# Patient Record
Sex: FEMALE | Race: WHITE | Employment: FULL TIME | URBAN - METROPOLITAN AREA
[De-identification: names, ages, dates, MRNs, and addresses within clinical notes are randomized per-mention and may not be internally consistent; named-entity substitution may affect disease eponyms.]

---

## 2017-03-15 ENCOUNTER — OFFICE VISIT (OUTPATIENT)
Dept: FAMILY MEDICINE CLINIC | Age: 52
End: 2017-03-15

## 2017-03-15 VITALS
SYSTOLIC BLOOD PRESSURE: 139 MMHG | BODY MASS INDEX: 30.21 KG/M2 | TEMPERATURE: 98 F | HEIGHT: 69 IN | RESPIRATION RATE: 16 BRPM | OXYGEN SATURATION: 94 % | HEART RATE: 88 BPM | DIASTOLIC BLOOD PRESSURE: 98 MMHG | WEIGHT: 204 LBS

## 2017-03-15 DIAGNOSIS — I10 ESSENTIAL HYPERTENSION: ICD-10-CM

## 2017-03-15 DIAGNOSIS — E78.5 HYPERLIPIDEMIA, UNSPECIFIED HYPERLIPIDEMIA TYPE: ICD-10-CM

## 2017-03-15 DIAGNOSIS — R73.03 PRE-DIABETES: Primary | ICD-10-CM

## 2017-03-15 LAB — HBA1C MFR BLD HPLC: 5.8 %

## 2017-03-15 RX ORDER — LOSARTAN POTASSIUM 25 MG/1
25 TABLET ORAL DAILY
Qty: 30 TAB | Refills: 0 | Status: SHIPPED | OUTPATIENT
Start: 2017-03-15 | End: 2017-04-14 | Stop reason: SDUPTHER

## 2017-03-15 RX ORDER — METFORMIN HYDROCHLORIDE 500 MG/1
500 TABLET ORAL 2 TIMES DAILY WITH MEALS
Qty: 180 TAB | Refills: 3 | Status: SHIPPED | OUTPATIENT
Start: 2017-03-15 | End: 2018-01-15 | Stop reason: SDUPTHER

## 2017-03-15 RX ORDER — LOSARTAN POTASSIUM 25 MG/1
25 TABLET ORAL DAILY
Qty: 30 TAB | Refills: 0 | Status: CANCELLED | OUTPATIENT
Start: 2017-03-15

## 2017-03-15 RX ORDER — METFORMIN HYDROCHLORIDE 500 MG/1
500 TABLET ORAL 2 TIMES DAILY WITH MEALS
Qty: 60 TAB | Refills: 2 | Status: SHIPPED | OUTPATIENT
Start: 2017-03-15 | End: 2017-03-15 | Stop reason: SDUPTHER

## 2017-03-15 NOTE — PROGRESS NOTES
Subjective:     Bruno Rocha is a 54 y.o. female who presents today with the following:  Chief Complaint   Patient presents with    Diabetes     HTN   Bruno Rocha is a 54 y.o. female with hypertension. Hypertension ROS: taking medications as instructed, no medication side effects noted, no TIA's, no chest pain on exertion, no dyspnea on exertion, no swelling of ankles. New concerns: has continued to have dry cough with Lisinopril, is now ready to try something else. Of note, patient has been out of BP meds for the last 4 days. Smoking status: continues to smoke 3/4 PPD. Has plan to wean off by this end of the summer. Pre-diabetes  Patient diagnosed with pre-diabetes; started on Metformin. Last A1c was down to 5.7 from 6.3. Has been able to sustain major dietary changes in the last 3 months. Feeling well overall. ROS:  Gen: denies fever, chills, fatigue, weight loss, weight gain  HEENT:denies blurry vision, nasal congestion, sore throat  Resp: denies dypsnea, cough, wheezing  CV: denies chest pain, palpitations, lower extremity edema  Abd: denies nausea, vomiting, diarrhea, constipation  Neuro: denies numbness/tingling  Endo: denies polyuria, polydipsia, heat/cold intolerance      Allergies   Allergen Reactions    Codeine Hives    Keflex [Cephalexin] Unknown (comments)    Morphine Hives    Pcn [Penicillins] Shortness of Breath    Sulfa (Sulfonamide Antibiotics) Hives         Current Outpatient Prescriptions:     losartan (COZAAR) 25 mg tablet, Take 1 Tab by mouth daily. , Disp: 30 Tab, Rfl: 0    metFORMIN (GLUCOPHAGE) 500 mg tablet, Take 1 Tab by mouth two (2) times daily (with meals). , Disp: 180 Tab, Rfl: 3    fluticasone (FLONASE) 50 mcg/actuation nasal spray, as needed for Rhinitis., Disp: , Rfl:     Past Medical History:   Diagnosis Date    Diabetes ([facility] [address]     gestational    Essential hypertension     Pre-diabetes        Past Surgical History:   Procedure Laterality Date    HX  SECTION      HX CHOLECYSTECTOMY      HX GYN  1988    ovarian cyst    HX HEENT      tonsillectomy and adenoidectomy    HX ORTHOPAEDIC  2001    foot sx    HX TUBAL LIGATION  1993       History   Smoking Status    Current Every Day Smoker    Packs/day: 0.75   Smokeless Tobacco    Never Used       Social History     Social History    Marital status: SINGLE     Spouse name: N/A    Number of children: N/A    Years of education: N/A     Occupational History          Social History Main Topics    Smoking status: Current Every Day Smoker     Packs/day: 0.75    Smokeless tobacco: Never Used    Alcohol use No    Drug use: None    Sexual activity: Not Asked     Other Topics Concern    None     Social History Narrative       Family History   Problem Relation Age of Onset    Heart Disease Mother     Arthritis-rheumatoid Mother     Kidney Disease Father          Objective:     Visit Vitals    BP (!) 139/98 (BP 1 Location: Left arm, BP Patient Position: Sitting)    Pulse 88    Temp 98 °F (36.7 °C) (Oral)    Resp 16    Ht 5' 9\" (1.753 m)    Wt 204 lb (92.5 kg)    LMP 02/15/2017    SpO2 94%    BMI 30.13 kg/m2     Gen: alert, oriented, no acute distress  Head: normocephalic, atraumatic  Eyes:sclera clear, conjunctiva clear  Oral: moist mucus membranes, no oral lesions, no pharyngeal exudate, no pharyngeal erythema  Neck: no carotid bruits, no JVD  Resp: Normal work of breathing, lungs CTAB, no w/r/r  CV: S1, S2 normal.  No murmurs, rubs, or gallops. Extremities: no edema    Results for orders placed or performed in visit on 03/15/17   AMB POC HEMOGLOBIN A1C   Result Value Ref Range    Hemoglobin A1c (POC) 5.8 %       Assessment/ Plan:   Arya Guerra was seen today for diabetes.     Diagnoses and all orders for this visit:    Pre-diabetes  -     AMB POC HEMOGLOBIN Q9O  -     METABOLIC PANEL, COMPREHENSIVE  -     LIPID PANEL  -     CBC WITH AUTOMATED DIFF  - Discontinue: metFORMIN (GLUCOPHAGE) 500 mg tablet; Take 1 Tab by mouth two (2) times daily (with meals). -     metFORMIN (GLUCOPHAGE) 500 mg tablet; Take 1 Tab by mouth two (2) times daily (with meals). Hyperlipidemia, unspecified hyperlipidemia type  -     METABOLIC PANEL, COMPREHENSIVE  -     LIPID PANEL    Essential hypertension  -     METABOLIC PANEL, COMPREHENSIVE  -     LIPID PANEL  -     CBC WITH AUTOMATED DIFF  -     losartan (COZAAR) 25 mg tablet; Take 1 Tab by mouth daily. Check labs, switch from Lisinopril to Losartan d/t cough. Follow up in 1 month for BP check. Verbal and written instructions (see AVS) provided.  Patient expresses understanding of diagnosis and treatment plan. Lavern Morrison.  Yeni Harris MD

## 2017-03-16 LAB
ALBUMIN SERPL-MCNC: 4.1 G/DL (ref 3.5–5.5)
ALBUMIN/GLOB SERPL: 1.4 {RATIO} (ref 1.2–2.2)
ALP SERPL-CCNC: 104 IU/L (ref 39–117)
ALT SERPL-CCNC: 10 IU/L (ref 0–32)
AST SERPL-CCNC: 12 IU/L (ref 0–40)
BASOPHILS # BLD AUTO: 0 X10E3/UL (ref 0–0.2)
BASOPHILS NFR BLD AUTO: 0 %
BILIRUB SERPL-MCNC: 0.3 MG/DL (ref 0–1.2)
BUN SERPL-MCNC: 9 MG/DL (ref 6–24)
BUN/CREAT SERPL: 13 (ref 9–23)
CALCIUM SERPL-MCNC: 9.2 MG/DL (ref 8.7–10.2)
CHLORIDE SERPL-SCNC: 99 MMOL/L (ref 96–106)
CHOLEST SERPL-MCNC: 211 MG/DL (ref 100–199)
CO2 SERPL-SCNC: 25 MMOL/L (ref 18–29)
CREAT SERPL-MCNC: 0.71 MG/DL (ref 0.57–1)
EOSINOPHIL # BLD AUTO: 0.4 X10E3/UL (ref 0–0.4)
EOSINOPHIL NFR BLD AUTO: 4 %
ERYTHROCYTE [DISTWIDTH] IN BLOOD BY AUTOMATED COUNT: 18.3 % (ref 12.3–15.4)
GLOBULIN SER CALC-MCNC: 2.9 G/DL (ref 1.5–4.5)
GLUCOSE SERPL-MCNC: 92 MG/DL (ref 65–99)
HCT VFR BLD AUTO: 41.1 % (ref 34–46.6)
HDLC SERPL-MCNC: 33 MG/DL
HGB BLD-MCNC: 13.1 G/DL (ref 11.1–15.9)
IMM GRANULOCYTES # BLD: 0 X10E3/UL (ref 0–0.1)
IMM GRANULOCYTES NFR BLD: 0 %
INTERPRETATION, 910389: NORMAL
LDLC SERPL CALC-MCNC: 138 MG/DL (ref 0–99)
LYMPHOCYTES # BLD AUTO: 3.8 X10E3/UL (ref 0.7–3.1)
LYMPHOCYTES NFR BLD AUTO: 38 %
MCH RBC QN AUTO: 24.2 PG (ref 26.6–33)
MCHC RBC AUTO-ENTMCNC: 31.9 G/DL (ref 31.5–35.7)
MCV RBC AUTO: 76 FL (ref 79–97)
MONOCYTES # BLD AUTO: 0.9 X10E3/UL (ref 0.1–0.9)
MONOCYTES NFR BLD AUTO: 9 %
NEUTROPHILS # BLD AUTO: 4.9 X10E3/UL (ref 1.4–7)
NEUTROPHILS NFR BLD AUTO: 49 %
PLATELET # BLD AUTO: 321 X10E3/UL (ref 150–379)
POTASSIUM SERPL-SCNC: 4.6 MMOL/L (ref 3.5–5.2)
PROT SERPL-MCNC: 7 G/DL (ref 6–8.5)
RBC # BLD AUTO: 5.42 X10E6/UL (ref 3.77–5.28)
SODIUM SERPL-SCNC: 140 MMOL/L (ref 134–144)
TRIGL SERPL-MCNC: 202 MG/DL (ref 0–149)
VLDLC SERPL CALC-MCNC: 40 MG/DL (ref 5–40)
WBC # BLD AUTO: 9.9 X10E3/UL (ref 3.4–10.8)

## 2017-04-14 ENCOUNTER — CLINICAL SUPPORT (OUTPATIENT)
Dept: FAMILY MEDICINE CLINIC | Age: 52
End: 2017-04-14

## 2017-04-14 VITALS
DIASTOLIC BLOOD PRESSURE: 87 MMHG | HEART RATE: 72 BPM | WEIGHT: 205 LBS | BODY MASS INDEX: 30.36 KG/M2 | OXYGEN SATURATION: 97 % | SYSTOLIC BLOOD PRESSURE: 128 MMHG | HEIGHT: 69 IN

## 2017-04-14 DIAGNOSIS — I10 ESSENTIAL HYPERTENSION: ICD-10-CM

## 2017-04-14 DIAGNOSIS — Z01.30 BP CHECK: Primary | ICD-10-CM

## 2017-04-14 RX ORDER — LOSARTAN POTASSIUM 25 MG/1
25 TABLET ORAL DAILY
Qty: 30 TAB | Refills: 0 | Status: SHIPPED | OUTPATIENT
Start: 2017-04-14 | End: 2017-04-14 | Stop reason: SDUPTHER

## 2017-04-14 RX ORDER — LOSARTAN POTASSIUM 25 MG/1
25 TABLET ORAL DAILY
Qty: 90 TAB | Refills: 3 | Status: SHIPPED | OUTPATIENT
Start: 2017-04-14 | End: 2018-03-17 | Stop reason: SDUPTHER

## 2017-04-14 NOTE — PROGRESS NOTES
Presents for a BP check   Vitals:    04/14/17 0753   BP: 128/87   Pulse: 72   SpO2: 97%   Weight: 205 lb (93 kg)   Height: 5' 9\" (1.753 m)     Prior to Admission medications    Medication Sig Start Date End Date Taking? Authorizing Provider   losartan (COZAAR) 25 mg tablet Take 1 Tab by mouth daily. 4/14/17  Yes Sapna Mohan MD   metFORMIN (GLUCOPHAGE) 500 mg tablet Take 1 Tab by mouth two (2) times daily (with meals). 3/15/17  Yes Sapna Mohan MD   fluticasone (FLONASE) 50 mcg/actuation nasal spray as needed for Rhinitis. Yes Historical Provider     Per Dr. Thu Skinner verbal order Losartan 30 day supply sent to [facility] and she will [facility] rest [facility]ills [facility].

## 2018-01-15 DIAGNOSIS — R73.03 PRE-DIABETES: ICD-10-CM

## 2018-01-15 RX ORDER — METFORMIN HYDROCHLORIDE 500 MG/1
TABLET ORAL
Qty: 60 TAB | Refills: 2 | Status: SHIPPED | OUTPATIENT
Start: 2018-01-15 | End: 2018-02-16 | Stop reason: SDUPTHER

## 2018-02-14 ENCOUNTER — HOSPITAL ENCOUNTER (EMERGENCY)
Age: 53
Discharge: HOME | End: 2018-02-15
Attending: EMERGENCY MEDICINE
Payer: COMMERCIAL

## 2018-02-14 DIAGNOSIS — R10.31 ABDOMINAL PAIN, RIGHT LOWER QUADRANT: Primary | ICD-10-CM

## 2018-02-14 DIAGNOSIS — N83.201 CYST OF RIGHT OVARY: ICD-10-CM

## 2018-02-14 LAB
APPEARANCE UR: CLEAR
BACTERIA URNS QL MICRO: NEGATIVE /HPF
BILIRUB UR QL: NEGATIVE
COLOR UR: ABNORMAL
EPITH CASTS URNS QL MICRO: ABNORMAL /LPF
GLUCOSE UR STRIP.AUTO-MCNC: NEGATIVE MG/DL
HGB UR QL STRIP: NEGATIVE
HYALINE CASTS URNS QL MICRO: ABNORMAL /LPF (ref 0–5)
KETONES UR QL STRIP.AUTO: ABNORMAL MG/DL
LEUKOCYTE ESTERASE UR QL STRIP.AUTO: NEGATIVE
NITRITE UR QL STRIP.AUTO: NEGATIVE
PH UR STRIP: 5.5 [PH] (ref 5–8)
PROT UR STRIP-MCNC: NEGATIVE MG/DL
RBC #/AREA URNS HPF: ABNORMAL /HPF (ref 0–5)
SP GR UR REFRACTOMETRY: 1.03 (ref 1–1.03)
UA: UC IF INDICATED,UAUC: ABNORMAL
UROBILINOGEN UR QL STRIP.AUTO: 0.2 EU/DL (ref 0.2–1)
WBC URNS QL MICRO: ABNORMAL /HPF (ref 0–4)

## 2018-02-14 PROCEDURE — 81001 URINALYSIS AUTO W/SCOPE: CPT | Performed by: EMERGENCY MEDICINE

## 2018-02-14 PROCEDURE — 99284 EMERGENCY DEPT VISIT MOD MDM: CPT

## 2018-02-14 RX ORDER — KETOROLAC TROMETHAMINE 30 MG/ML
30 INJECTION, SOLUTION INTRAMUSCULAR; INTRAVENOUS
Status: COMPLETED | OUTPATIENT
Start: 2018-02-14 | End: 2018-02-15

## 2018-02-14 RX ORDER — SODIUM CHLORIDE 0.9 % (FLUSH) 0.9 %
5 SYRINGE (ML) INJECTION EVERY 8 HOURS
Status: DISCONTINUED | OUTPATIENT
Start: 2018-02-14 | End: 2018-02-15 | Stop reason: HOSPADM

## 2018-02-14 NOTE — LETTER
[address] 
[facility] EMERGENCY DEPT 
[address] [place] [address] [address] [id number] [phone] Work/School Note Date: 2/14/2018 To Whom It May concern: 
 
Luis Alfredo Gabriel was seen and treated today in the emergency room by the following provider(s): 
Attending Provider: Natan Colunga MD. Luis Alfredo Gabriel may return to work on 2/16/2018. Sincerely, 
 
 
 
 
Deborah

## 2018-02-14 NOTE — LETTER
[address] 
[facility] EMERGENCY DEPT 
[address] [place] [address] [phone] [id number] [phone] Work/School Note Date: 2/14/2018 To Whom It May concern: 
 
Teresa Harris was seen and treated today in the emergency room by the following provider(s): 
Attending Provider: Gail Latif MD.  Her daughter, Mrs. Rancho Jeffries, was with her at bedside throughout her stay. She may return to work on 3/16/2018. Sincerely, 
 
 
Deborah

## 2018-02-15 ENCOUNTER — APPOINTMENT (OUTPATIENT)
Dept: CT IMAGING | Age: 53
End: 2018-02-15
Attending: EMERGENCY MEDICINE
Payer: COMMERCIAL

## 2018-02-15 VITALS
TEMPERATURE: 98.4 F | HEART RATE: 112 BPM | RESPIRATION RATE: 20 BRPM | DIASTOLIC BLOOD PRESSURE: 69 MMHG | BODY MASS INDEX: 30.37 KG/M2 | OXYGEN SATURATION: 93 % | SYSTOLIC BLOOD PRESSURE: 119 MMHG | HEIGHT: 68 IN | WEIGHT: 200.4 LBS

## 2018-02-15 LAB
ALBUMIN SERPL-MCNC: 3.7 G/DL (ref 3.5–5)
ALBUMIN/GLOB SERPL: 1.1 {RATIO} (ref 1.1–2.2)
ALP SERPL-CCNC: 93 U/L (ref 45–117)
ALT SERPL-CCNC: 18 U/L (ref 12–78)
ANION GAP SERPL CALC-SCNC: 9 MMOL/L (ref 5–15)
AST SERPL-CCNC: 10 U/L (ref 15–37)
BASOPHILS # BLD: 0 K/UL (ref 0–0.1)
BASOPHILS NFR BLD: 0 % (ref 0–1)
BILIRUB SERPL-MCNC: 0.6 MG/DL (ref 0.2–1)
BUN SERPL-MCNC: 12 MG/DL (ref 6–20)
BUN/CREAT SERPL: 14 (ref 12–20)
CALCIUM SERPL-MCNC: 8.6 MG/DL (ref 8.5–10.1)
CHLORIDE SERPL-SCNC: 105 MMOL/L (ref 97–108)
CO2 SERPL-SCNC: 24 MMOL/L (ref 21–32)
CREAT SERPL-MCNC: 0.88 MG/DL (ref 0.55–1.02)
DIFFERENTIAL METHOD BLD: ABNORMAL
EOSINOPHIL # BLD: 0.1 K/UL (ref 0–0.4)
EOSINOPHIL NFR BLD: 1 % (ref 0–7)
ERYTHROCYTE [DISTWIDTH] IN BLOOD BY AUTOMATED COUNT: 14.6 % (ref 11.5–14.5)
GLOBULIN SER CALC-MCNC: 3.4 G/DL (ref 2–4)
GLUCOSE SERPL-MCNC: 140 MG/DL (ref 65–100)
HCT VFR BLD AUTO: 40.4 % (ref 35–47)
HGB BLD-MCNC: 13.4 G/DL (ref 11.5–16)
IMM GRANULOCYTES # BLD: 0 K/UL (ref 0–0.04)
IMM GRANULOCYTES NFR BLD AUTO: 0 % (ref 0–0.5)
LYMPHOCYTES # BLD: 1 K/UL (ref 0.8–3.5)
LYMPHOCYTES NFR BLD: 8 % (ref 12–49)
MCH RBC QN AUTO: 27.2 PG (ref 26–34)
MCHC RBC AUTO-ENTMCNC: 33.2 G/DL (ref 30–36.5)
MCV RBC AUTO: 82.1 FL (ref 80–99)
MONOCYTES # BLD: 0.4 K/UL (ref 0–1)
MONOCYTES NFR BLD: 4 % (ref 5–13)
NEUTS SEG # BLD: 10.6 K/UL (ref 1.8–8)
NEUTS SEG NFR BLD: 87 % (ref 32–75)
NRBC # BLD: 0 K/UL (ref 0–0.01)
NRBC BLD-RTO: 0 PER 100 WBC
PLATELET # BLD AUTO: 214 K/UL (ref 150–400)
PMV BLD AUTO: 11.5 FL (ref 8.9–12.9)
POTASSIUM SERPL-SCNC: 3.5 MMOL/L (ref 3.5–5.1)
PROT SERPL-MCNC: 7.1 G/DL (ref 6.4–8.2)
RBC # BLD AUTO: 4.92 M/UL (ref 3.8–5.2)
SODIUM SERPL-SCNC: 138 MMOL/L (ref 136–145)
WBC # BLD AUTO: 12.1 K/UL (ref 3.6–11)

## 2018-02-15 PROCEDURE — 96361 HYDRATE IV INFUSION ADD-ON: CPT

## 2018-02-15 PROCEDURE — 74176 CT ABD & PELVIS W/O CONTRAST: CPT

## 2018-02-15 PROCEDURE — 96375 TX/PRO/DX INJ NEW DRUG ADDON: CPT

## 2018-02-15 PROCEDURE — 80053 COMPREHEN METABOLIC PANEL: CPT | Performed by: EMERGENCY MEDICINE

## 2018-02-15 PROCEDURE — 74011250636 HC RX REV CODE- 250/636: Performed by: EMERGENCY MEDICINE

## 2018-02-15 PROCEDURE — 85025 COMPLETE CBC W/AUTO DIFF WBC: CPT | Performed by: EMERGENCY MEDICINE

## 2018-02-15 PROCEDURE — 96374 THER/PROPH/DIAG INJ IV PUSH: CPT

## 2018-02-15 PROCEDURE — 36415 COLL VENOUS BLD VENIPUNCTURE: CPT | Performed by: EMERGENCY MEDICINE

## 2018-02-15 RX ORDER — IBUPROFEN 600 MG/1
600 TABLET ORAL
Qty: 30 TAB | Refills: 0 | Status: SHIPPED | OUTPATIENT
Start: 2018-02-15 | End: 2018-02-15

## 2018-02-15 RX ORDER — FENTANYL CITRATE 50 UG/ML
50 INJECTION, SOLUTION INTRAMUSCULAR; INTRAVENOUS
Status: COMPLETED | OUTPATIENT
Start: 2018-02-15 | End: 2018-02-15

## 2018-02-15 RX ORDER — HYDROCODONE BITARTRATE AND ACETAMINOPHEN 5; 325 MG/1; MG/1
1 TABLET ORAL
Qty: 12 TAB | Refills: 0 | Status: SHIPPED | OUTPATIENT
Start: 2018-02-15 | End: 2019-02-27

## 2018-02-15 RX ADMIN — SODIUM CHLORIDE 1000 ML: 900 INJECTION, SOLUTION INTRAVENOUS at 00:14

## 2018-02-15 RX ADMIN — FENTANYL CITRATE 50 MCG: 50 INJECTION, SOLUTION INTRAMUSCULAR; INTRAVENOUS at 00:53

## 2018-02-15 RX ADMIN — KETOROLAC TROMETHAMINE 30 MG: 30 INJECTION, SOLUTION INTRAMUSCULAR at 00:14

## 2018-02-15 NOTE — DISCHARGE INSTRUCTIONS
Abdominal Pain: Care Instructions  Your Care Instructions    Abdominal pain has many possible causes. Some aren't serious and get better on their own in a few days. Others need more testing and treatment. If your pain continues or gets worse, you need to be rechecked and may need more tests to find out what is wrong. You may need surgery to correct the problem. Don't ignore new symptoms, such as fever, nausea and vomiting, urination problems, pain that gets worse, and dizziness. These may be signs of a more serious problem. Your doctor may have recommended a follow-up visit in the next 8 to 12 hours. If you are not getting better, you may need more tests or treatment. The doctor has checked you carefully, but problems can develop later. If you notice any problems or new symptoms, get medical treatment right away. Follow-up care is a key part of your treatment and safety. Be sure to make and go to all appointments, and call your doctor if you are having problems. It's also a good idea to know your test results and keep a list of the medicines you take. How can you care for yourself at home? · Rest until you feel better. · To prevent dehydration, drink plenty of fluids, enough so that your urine is light yellow or clear like water. Choose water and other caffeine-free clear liquids until you feel better. If you have kidney, heart, or liver disease and have to limit fluids, talk with your doctor before you increase the amount of fluids you drink. · If your stomach is upset, eat mild foods, such as rice, dry toast or crackers, bananas, and applesauce. Try eating several small meals instead of two or three large ones. · Wait until 48 hours after all symptoms have gone away before you have spicy foods, alcohol, and drinks that contain caffeine. · Do not eat foods that are high in fat. · Avoid anti-inflammatory medicines such as aspirin, ibuprofen (Advil, Motrin), and naproxen (Aleve).  These can cause stomach upset. Talk to your doctor if you take daily aspirin for another health problem. When should you call for help? Call 911 anytime you think you may need emergency care. For example, call if:  ? · You passed out (lost consciousness). ? · You pass maroon or very bloody stools. ? · You vomit blood or what looks like coffee grounds. ? · You have new, severe belly pain. ?Call your doctor now or seek immediate medical care if:  ? · Your pain gets worse, especially if it becomes focused in one area of your belly. ? · You have a new or higher fever. ? · Your stools are black and look like tar, or they have streaks of blood. ? · You have unexpected vaginal bleeding. ? · You have symptoms of a urinary tract infection. These may include:  ¨ Pain when you urinate. ¨ Urinating more often than usual.  ¨ Blood in your urine. ? · You are dizzy or lightheaded, or you feel like you may faint. ? Watch closely for changes in your health, and be sure to contact your doctor if:  ? · You are not getting better after 1 day (24 hours). Where can you learn more? Go to http://uriel-ganga.info/. Enter V957 in the search box to learn more about \"Abdominal Pain: Care Instructions. \"  Current as of: March 20, 2017  Content Version: 11.4  © 9377-1021 [employer]. Care instructions adapted under license by [employer] (which disclaims liability or warranty for this information). If you have questions about a medical condition or this instruction, always ask your healthcare professional. [place] [address] any warranty or liability for your use of this information. Functional Ovarian Cyst: Care Instructions  Your Care Instructions    A functional ovarian cyst is a sac that forms on the surface of a woman's ovary during ovulation. The sac holds a maturing egg. Usually the sac goes away after the egg is released.  But if the egg is not released, or if the sac closes up after the egg is released, the sac can swell up with fluid and form a cyst.  Functional ovarian cysts are different than ovarian growths caused by other problems, such as cancer. Most functional ovarian cysts cause no symptoms and go away on their own. Some cause mild pain. Others can cause severe pain when they rupture or bleed. Follow-up care is a key part of your treatment and safety. Be sure to make and go to all appointments, and call your doctor if you are having problems. It's also a good idea to know your test results and keep a list of the medicines you take. How can you care for yourself at home? · Use heat, such as a hot water bottle, a heating pad set on low, or a warm bath, to relax tense muscles and relieve cramping. · Be safe with medicines. Take pain medicines exactly as directed. ¨ If the doctor gave you a prescription medicine for pain, take it as prescribed. ¨ If you are not taking a prescription pain medicine, ask your doctor if you can take an over-the-counter medicine. · Avoid constipation. Make sure you drink enough fluids and include fruits, vegetables, and fiber in your diet each day. Constipation does not cause ovarian cysts, but it may make your pelvic pain worse. When should you call for help? Call your doctor now or seek immediate medical care if:  ? · You have severe vaginal bleeding. ? · You have new or worse belly or pelvic pain. ? Watch closely for changes in your health, and be sure to contact your doctor if:  ? · You have unusual vaginal bleeding. ? · You do not get better as expected. Where can you learn more? Go to http://uriel-ganga.info/. Enter D232 in the search box to learn more about \"Functional Ovarian Cyst: Care Instructions. \"  Current as of: October 13, 2016  Content Version: 11.4  © 1289-5054 [employer].  Care instructions adapted under license by [employer] (which disclaims liability or warranty for this information). If you have questions about a medical condition or this instruction, always ask your healthcare professional. [place] [address] any warranty or liability for your use of this information.

## 2018-02-15 NOTE — ED NOTES
Dr. David Nuñez gave and reviewed discharge instructions with the patient. The patient verbalized understanding. The patient was given opportunity for questions. Patient discharged in stable condition to the waiting room via ambulatory with female visitor.

## 2018-02-15 NOTE — ED NOTES
Pt arrived via wheelchair to room #32 from triage. Pt with c/o of R flank pain x 1830 last night. Pt reports hx of kidney stones, noting that current sx's feel similar. Pt also reports n/v. Pt resting in position of comfort. Call bell within reach. Pt placed on monitor x 2.

## 2018-02-15 NOTE — ED PROVIDER NOTES
EMERGENCY DEPARTMENT HISTORY AND PHYSICAL EXAM      Date: 2/14/2018  Patient Name: Geraldo Headley    History of Presenting Illness     Chief Complaint   Patient presents with   [address] she has a kidney stone. Previous history of them       History Provided By: Patient    HPI: Geraldo Headley, 50 y.o. female with PMHx significant for kidney stones, presents ambulatory to the ED with c/o sudden onset RLQ abdominal ~7:00 PM today. She reports associated nausea/vomiting that began shortly after her onset of pain. Pt states she has been experiencing intermittent mild R sided lower back pain for the past couple of days, but didn't think anything of it. She also reports increased urgency, but is only able to urinate a small amount. Pt states her last kidney stone was a while ago and does not have a urologist. She reports she is allergic to Morphine, but can take Fentanyl. Pt denies any recent sick contact. Pt specifically denies any recent fevers, hematuria, or dysuria. PCP: Zeynep Carter MD    There are no other complaints, changes, or physical findings at this time. Current Facility-Administered Medications   Medication Dose Route Frequency Provider Last Rate Last Dose    SALINE PERIPHERAL FLUSH Q8H soln 5 mL  5 mL InterCATHeter Q8H Cherylene Beauvais, MD         Current Outpatient Prescriptions   Medication Sig Dispense Refill    HYDROcodone-acetaminophen (LORTAB 5-325) 5-325 mg per tablet Take 1 Tab by mouth every six (6) hours as needed for Pain. Max Daily Amount: 4 Tabs. 12 Tab 0    metFORMIN (GLUCOPHAGE) 500 mg tablet TAKE 1 TABLET BY MOUTH TWO (2) TIMES DAILY (WITH MEALS). 60 Tab 2    losartan (COZAAR) 25 mg tablet Take 1 Tab by mouth daily.  80 Tab 3       Past History     Past Medical History:  Past Medical History:   Diagnosis Date    Diabetes ([facility] [address]     gestational    Essential hypertension     Pre-diabetes        Past Surgical History:  Past Surgical History:   Procedure Laterality Date    HX  SECTION      HX CHOLECYSTECTOMY      HX GYN  1988    ovarian cyst    HX HEENT      tonsillectomy and adenoidectomy    HX ORTHOPAEDIC  2001    foot sx    HX TUBAL LIGATION         Family History:  Family History   Problem Relation Age of Onset    Heart Disease Mother    [de-identified] Arthritis-rheumatoid Mother     Kidney Disease Father        Social History:  Social History   Substance Use Topics    Smoking status: Current Every Day Smoker     Packs/day: 0.75    Smokeless tobacco: Never Used    Alcohol use No       Allergies: Allergies   Allergen Reactions    Codeine Hives    Keflex [Cephalexin] Unknown (comments)    Morphine Hives    Pcn [Penicillins] Shortness of Breath    Sulfa (Sulfonamide Antibiotics) Hives         Review of Systems   Review of Systems   Constitutional: Negative for activity change, appetite change, fatigue and fever. HENT: Negative. Negative for congestion, rhinorrhea and sore throat. Respiratory: Negative. Negative for cough, shortness of breath and wheezing. Cardiovascular: Negative. Negative for chest pain and leg swelling. Gastrointestinal: Positive for abdominal pain (RLQ), nausea and vomiting. Negative for abdominal distention, constipation and diarrhea. Endocrine: Negative. Genitourinary: Positive for urgency. Negative for difficulty urinating, dysuria, menstrual problem, vaginal bleeding and vaginal discharge. +decreased output   Musculoskeletal: Positive for back pain (R sided lumbar). Negative for arthralgias, joint swelling and myalgias. Skin: Negative. Negative for rash. Neurological: Negative. Negative for dizziness, weakness, light-headedness and headaches. Psychiatric/Behavioral: Negative. Physical Exam   Physical Exam   Constitutional: She is oriented to person, place, and time. She appears well-developed and well-nourished. HENT:   Head: Atraumatic.    Eyes: EOM are normal.   Cardiovascular: Regular rhythm, normal heart sounds and intact distal pulses. Exam reveals no gallop and no friction rub. No murmur heard. Initial tachycardia   Pulmonary/Chest: Effort normal and breath sounds normal. No respiratory distress. She has no wheezes. She has no rales. She exhibits no tenderness. Abdominal: Soft. Bowel sounds are normal. She exhibits no distension and no mass. There is no rebound, no guarding and no CVA tenderness. Very mild RLQ tenderness without any rebound, guarding, or peritoneal signs. Musculoskeletal: Normal range of motion. She exhibits no edema or tenderness. Neurological: She is oriented to person, place, and time. Skin: Skin is warm. Psychiatric: She has a normal mood and affect. Nursing note and vitals reviewed.     Diagnostic Study Results     Labs -     Recent Results (from the past 12 hour(s))   URINALYSIS W/ REFLEX CULTURE    Collection Time: 02/14/18 11:08 PM   Result Value Ref Range    Color YELLOW/STRAW      Appearance CLEAR CLEAR      Specific gravity 1.026 1.003 - 1.030      pH (UA) 5.5 5.0 - 8.0      Protein NEGATIVE  NEG mg/dL    Glucose NEGATIVE  NEG mg/dL    Ketone TRACE (A) NEG mg/dL    Bilirubin NEGATIVE  NEG      Blood NEGATIVE  NEG      Urobilinogen 0.2 0.2 - 1.0 EU/dL    Nitrites NEGATIVE  NEG      Leukocyte Esterase NEGATIVE  NEG      WBC 0-4 0 - 4 /hpf    RBC 0-5 0 - 5 /hpf    Epithelial cells MODERATE (A) FEW /lpf    Bacteria NEGATIVE  NEG /hpf    UA:UC IF INDICATED CULTURE NOT INDICATED BY UA RESULT CNI      Hyaline cast 2-5 0 - 5 /lpf   CBC WITH AUTOMATED DIFF    Collection Time: 02/15/18 12:17 AM   Result Value Ref Range    WBC 12.1 (H) 3.6 - 11.0 K/uL    RBC 4.92 3.80 - 5.20 M/uL    HGB 13.4 11.5 - 16.0 g/dL    HCT 40.4 35.0 - 47.0 %    MCV 82.1 80.0 - 99.0 FL    MCH 27.2 26.0 - 34.0 PG    MCHC 33.2 30.0 - 36.5 g/dL    RDW 14.6 (H) 11.5 - 14.5 %    PLATELET 318 994 - 841 K/uL    MPV 11.5 8.9 - 12.9 FL    NRBC 0.0 0  WBC    ABSOLUTE NRBC 0.00 0.00 - 0.01 K/uL    NEUTROPHILS 87 (H) 32 - 75 %    LYMPHOCYTES 8 (L) 12 - 49 %    MONOCYTES 4 (L) 5 - 13 %    EOSINOPHILS 1 0 - 7 %    BASOPHILS 0 0 - 1 %    IMMATURE GRANULOCYTES 0 0.0 - 0.5 %    ABS. NEUTROPHILS 10.6 (H) 1.8 - 8.0 K/UL    ABS. LYMPHOCYTES 1.0 0.8 - 3.5 K/UL    ABS. MONOCYTES 0.4 0.0 - 1.0 K/UL    ABS. EOSINOPHILS 0.1 0.0 - 0.4 K/UL    ABS. BASOPHILS 0.0 0.0 - 0.1 K/UL    ABS. IMM. GRANS. 0.0 0.00 - 0.04 K/UL    DF AUTOMATED     METABOLIC PANEL, COMPREHENSIVE    Collection Time: 02/15/18 12:17 AM   Result Value Ref Range    Sodium 138 136 - 145 mmol/L    Potassium 3.5 3.5 - 5.1 mmol/L    Chloride 105 97 - 108 mmol/L    CO2 24 21 - 32 mmol/L    Anion gap 9 5 - 15 mmol/L    Glucose 140 (H) 65 - 100 mg/dL    BUN 12 6 - 20 MG/DL    Creatinine 0.88 0.55 - 1.02 MG/DL    BUN/Creatinine ratio 14 12 - 20      GFR est AA >60 >60 ml/min/1.73m2    GFR est non-AA >60 >60 ml/min/1.73m2    Calcium 8.6 8.5 - 10.1 MG/DL    Bilirubin, total 0.6 0.2 - 1.0 MG/DL    ALT (SGPT) 18 12 - 78 U/L    AST (SGOT) 10 (L) 15 - 37 U/L    Alk. phosphatase 93 45 - 117 U/L    Protein, total 7.1 6.4 - 8.2 g/dL    Albumin 3.7 3.5 - 5.0 g/dL    Globulin 3.4 2.0 - 4.0 g/dL    A-G Ratio 1.1 1.1 - 2.2         Radiologic Studies -   CT Results  (Last 48 hours)               02/15/18 0028  CT ABD PELV WO CONT Final result    Impression:  IMPRESSION:   Right ovarian cyst. Otherwise unremarkable. Narrative:  EXAM:  CT ABD PELV WO CONT       INDICATION: flank pain, h/o kidney stone  acute right flank pain, history of   kidney stones       COMPARISON: None       CONTRAST:  None. TECHNIQUE:    Thin axial images were obtained through the abdomen and pelvis. Coronal and   sagittal reconstructions were generated. Oral contrast was not administered. CT   dose reduction was achieved through use of a standardized protocol tailored for   this examination and automatic exposure control for dose modulation.         The absence of intravenous contrast material reduces the sensitivity for   evaluation of the solid parenchymal organs of the abdomen. FINDINGS:    LUNG BASES: Clear. INCIDENTALLY IMAGED HEART AND MEDIASTINUM: Unremarkable. LIVER: No mass or biliary dilatation. GALLBLADDER: Surgically removed. SPLEEN: No mass. PANCREAS: No mass or ductal dilatation. ADRENALS: Unremarkable. KIDNEYS/URETERS: No mass, calculus, or hydronephrosis. STOMACH: Unremarkable. SMALL BOWEL: No dilatation or wall thickening. COLON: No dilatation or wall thickening. APPENDIX: Unremarkable. PERITONEUM: No ascites or pneumoperitoneum. RETROPERITONEUM: No lymphadenopathy or aortic aneurysm. REPRODUCTIVE ORGANS: 4.4 cm right ovarian cyst.   URINARY BLADDER: No mass or calculus. BONES: No destructive bone lesion. ADDITIONAL COMMENTS: N/A                     Medical Decision Making   I am the first provider for this patient. I reviewed the vital signs, available nursing notes, past medical history, past surgical history, family history and social history. Vital Signs-Reviewed the patient's vital signs. Patient Vitals for the past 12 hrs:   Temp Pulse Resp BP SpO2   02/15/18 0245 - - - 119/69 93 %   02/15/18 0230 - - - 106/66 93 %   02/15/18 0215 - - - 107/62 93 %   02/15/18 0200 - - - 125/79 94 %   02/15/18 0145 - - - 124/80 95 %   02/15/18 0130 - - - 123/80 94 %   02/15/18 0115 - - - 119/82 95 %   02/15/18 0100 - - - 118/84 94 %   02/15/18 0045 - - - 126/82 95 %   02/15/18 0043 - - - - 95 %   02/15/18 0042 - - - 132/81 -   02/14/18 2305 98.4 °F (36.9 °C) (!) 112 20 (!) 157/102 94 %       Pulse Oximetry Analysis - 95% on RA    Cardiac Monitor:   Rate: 112 bpm  Rhythm: Sinus Tachycardia      Records Reviewed: Nursing Notes and Old Medical Records    Provider Notes (Medical Decision Making):   DDx: UTI, pyelonephritis, ureteral stone, ovarian cyst, colitis    ED Course:   Initial assessment performed.  The patients presenting problems have been discussed, and they are in agreement with the care plan formulated and outlined with them. I have encouraged them to ask questions as they arise throughout their visit. PROGRESS NOTE  2:35 AM  Re-evaluated pt. She states her pain has improved. 3:19 AM  HR normalized during ED visit to 83 at the time of discharge. Pt notes she has taken Lortab in the past without any issues, but doesn't take Ibuprofen. Discharge Note:  3:18 AM  The patient has been re-evaluated and is ready for discharge. Reviewed available results with patient. Counseled patient on diagnosis and care plan. Patient has expressed understanding, and all questions have been answered. Patient agrees with plan and agrees to follow up as recommended, or to return to the ED if their symptoms worsen. Discharge instructions have been provided and explained to the patient, along with reasons to return to the ED. PLAN:  1. Discharge Medication List as of 2/15/2018  3:24 AM        2. Follow-up Information     Follow up With Details Comments [address], [place] In 1 day  [address]  [address] [facility]      Araceli Fuller MD In 3 days  [address] [facility]  [phone]      [address] [facility] EMERGENCY DEPT  As needed, If symptoms worsen [address] [facility]  [address] [facility]  [phone]        Return to ED if worse     Diagnosis     Clinical Impression:   1. Abdominal pain, right lower quadrant    2. Cyst of right ovary        Attestations: This note is prepared by Jeniffer Hawk, acting as Scribe for Hans Ruffin MD.    The scribe's documentation has been prepared under my direction and personally reviewed by me in its entirety. I confirm that the note above accurately reflects all work, treatment, procedures, and medical decision making performed by me.   Hans Ruffin MD

## 2018-02-16 ENCOUNTER — OFFICE VISIT (OUTPATIENT)
Dept: FAMILY MEDICINE CLINIC | Age: 53
End: 2018-02-16

## 2018-02-16 VITALS
DIASTOLIC BLOOD PRESSURE: 93 MMHG | OXYGEN SATURATION: 96 % | RESPIRATION RATE: 14 BRPM | WEIGHT: 198 LBS | HEIGHT: 68 IN | TEMPERATURE: 98.1 F | SYSTOLIC BLOOD PRESSURE: 145 MMHG | BODY MASS INDEX: 30.01 KG/M2 | HEART RATE: 63 BPM

## 2018-02-16 DIAGNOSIS — Z12.39 SCREENING FOR BREAST CANCER: ICD-10-CM

## 2018-02-16 DIAGNOSIS — R10.9 FLANK PAIN: Primary | ICD-10-CM

## 2018-02-16 DIAGNOSIS — R19.7 DIARRHEA, UNSPECIFIED TYPE: ICD-10-CM

## 2018-02-16 DIAGNOSIS — R73.03 PRE-DIABETES: ICD-10-CM

## 2018-02-16 DIAGNOSIS — Z12.11 SCREENING FOR COLON CANCER: ICD-10-CM

## 2018-02-16 LAB
BILIRUB UR QL STRIP: NEGATIVE
GLUCOSE UR-MCNC: NEGATIVE MG/DL
HBA1C MFR BLD HPLC: 5.2 %
KETONES P FAST UR STRIP-MCNC: NEGATIVE MG/DL
PH UR STRIP: 5.5 [PH] (ref 4.6–8)
PROT UR QL STRIP: NEGATIVE
SP GR UR STRIP: 1.01 (ref 1–1.03)
UA UROBILINOGEN AMB POC: NORMAL (ref 0.2–1)
URINALYSIS CLARITY POC: CLEAR
URINALYSIS COLOR POC: YELLOW
URINE BLOOD POC: NORMAL
URINE LEUKOCYTES POC: NEGATIVE
URINE NITRITES POC: NEGATIVE

## 2018-02-16 RX ORDER — METFORMIN HYDROCHLORIDE 500 MG/1
TABLET ORAL
Qty: 60 TAB | Refills: 2 | Status: SHIPPED | OUTPATIENT
Start: 2018-02-16 | End: 2018-08-03 | Stop reason: SDUPTHER

## 2018-02-16 NOTE — PROGRESS NOTES
Subjective:     Reymundo Odonnell is a 50 y.o. female who presents today with the following:  Chief Complaint   Patient presents with    Diarrhea    ED Follow-up     ED follow up    Patient evaluated in ED on 2/14/18 for abdominal pain. She had sudden onset of RLQ earlier than evening along with nausea, vomiting. CT abdomen was performed and showed R ovarian cyst, otherwise unremarkable. Her pain improved with Fentanyl and she was discharged home with 12 tabs of Lortab. Since that time patient has developed diarrhea. Denies nausea,vomiting. Has been drinking fluids. Patient has not tried immodium. This is day 2 of symptoms. Abdominal pain has actually improved. Denies fever or chills. HM  Mammogram: Henri Ellis, August 2016  Colon cancer screening: prefers colonoscopy  Flu: declines    Pre-Diabetes  Patient with diet controlled pre-diabetes. Last A1c was 5.8. Patient has been eating more [employer] potato soup recently, is worried that her A1c would be high because of this. ROS:  Gen: denies fever, chills, fatigue, weight loss, weight gain  HEENT:denies blurry vision, nasal congestion, sore throat  Resp: denies dypsnea, cough, wheezing  CV: denies chest pain, palpitations, lower extremity edema  Abd: denies nausea, vomiting, diarrhea, constipation  Neuro: denies numbness/tingling  Endo: denies polyuria, polydipsia, heat/cold intolerance  Heme: no lymphadenopathy    Allergies   Allergen Reactions    Codeine Hives    Keflex [Cephalexin] Unknown (comments)    Morphine Hives    Pcn [Penicillins] Shortness of Breath    Sulfa (Sulfonamide Antibiotics) Hives         Current Outpatient Prescriptions:     metFORMIN (GLUCOPHAGE) 500 mg tablet, TAKE 1 TABLET BY MOUTH TWO (2) TIMES DAILY (WITH MEALS). , Disp: 60 Tab, Rfl: 2    losartan (COZAAR) 25 mg tablet, Take 1 Tab by mouth daily. , Disp: 90 Tab, Rfl: 3    HYDROcodone-acetaminophen (LORTAB 5-325) 5-325 mg per tablet, Take 1 Tab by mouth every six (6) hours as needed for Pain. Max Daily Amount: 4 Tabs., Disp: 12 Tab, Rfl: 0    Past Medical History:   Diagnosis Date    Diabetes ([address]     gestational    Essential hypertension     Pre-diabetes        Past Surgical History:   Procedure Laterality Date    HX  SECTION      HX CHOLECYSTECTOMY      HX GYN      ovarian cyst    HX HEENT      tonsillectomy and adenoidectomy    HX ORTHOPAEDIC  2001    foot sx    HX TUBAL LIGATION  1993       History   Smoking Status    Former Smoker    Quit date: 2018   Smokeless Tobacco    Never Used       Social History     Social History    Marital status: SINGLE     Spouse name: N/A    Number of children: N/A    Years of education: N/A     Occupational History          Social History Main Topics    Smoking status: Former Smoker     Quit date: 2018    Smokeless tobacco: Never Used    Alcohol use No    Drug use: None    Sexual activity: Not Asked     Other Topics Concern    None     Social History Narrative       Family History   Problem Relation Age of Onset    Heart Disease Mother     Arthritis-rheumatoid Mother     Kidney Disease Father          Objective:     Visit Vitals    BP (!) 145/93 (BP 1 Location: Left arm, BP Patient Position: Sitting)    Pulse 63    Temp 98.1 °F (36.7 °C) (Oral)    Resp 14    Ht 5' 8\" (1.727 m)    Wt 198 lb (89.8 kg)    SpO2 96%    BMI 30.11 kg/m2     Gen: alert, oriented, no acute distress  Head: normocephalic, atraumatic  Eyes:sclera clear, conjunctiva clear  Oral: moist mucus membranes, no oral lesions, no pharyngeal exudate, no pharyngeal erythema  Neck: symmetric normal sized thyroid, no carotid bruits, no JVD  Resp: Normal work of breathing, lungs CTAB, no w/r/r  CV: S1, S2 normal.  No murmurs, rubs, or gallops. Abd:  Normal bowel sounds. Soft, not tender, not distended.     Skin: no rash             Extremities: no edema    Results for orders placed or performed in visit on 02/16/18   AMB POC HEMOGLOBIN A1C   Result Value Ref Range    Hemoglobin A1c (POC) 5.2 %       Labs reviewed from ED: Glucose 140, BUn/Cr WNL, LE WNL  CBC: WBC 12.1  UA: WNL    Assessment/ Plan:   Diagnoses and all orders for this visit:    1. Flank pain  -     CULTURE, URINE  -     AMB POC URINALYSIS DIP STICK AUTO W/O MICRO    2. Screening for breast cancer  -     [facility] MAMMO BI SCREENING INCL CAD; Future    3. Screening for colon cancer  -     REFERRAL FOR COLONOSCOPY    4. Pre-diabetes  -     LIPID PANEL  -     AMB POC HEMOGLOBIN A1C  -     metFORMIN (GLUCOPHAGE) 500 mg tablet; TAKE 1 TABLET BY MOUTH TWO (2) TIMES DAILY (WITH MEALS). 5. Diarrhea, unspecified type  -     WBC, STOOL  -     CULTURE, STOOL  -     OVA & PARASITES, STOOL     If diarrhea persists through the weekend then will check stool studies; patient to bring this in. Check labs. Metformin refilled. Pt to schedule colonoscopy and mammogram.    Verbal and written instructions (see AVS) provided.  Patient expresses understanding of diagnosis and treatment plan. Garfield Maldonado.  Jolie Woods MD

## 2018-02-16 NOTE — PROGRESS NOTES
Chief Complaint   Patient presents with    Diarrhea    ED Follow-up     Patient is having frequent, watery diarrhea. Patient was seen at ED for possible kidney stone.

## 2018-02-17 LAB
BACTERIA UR CULT: NO GROWTH
CHOLEST SERPL-MCNC: 150 MG/DL (ref 100–199)
HDLC SERPL-MCNC: 34 MG/DL
INTERPRETATION, 910389: NORMAL
LDLC SERPL CALC-MCNC: 94 MG/DL (ref 0–99)
TRIGL SERPL-MCNC: 111 MG/DL (ref 0–149)
VLDLC SERPL CALC-MCNC: 22 MG/DL (ref 5–40)

## 2018-03-17 DIAGNOSIS — I10 ESSENTIAL HYPERTENSION: ICD-10-CM

## 2018-03-18 RX ORDER — LOSARTAN POTASSIUM 25 MG/1
TABLET ORAL
Qty: 90 TAB | Refills: 3 | Status: SHIPPED | OUTPATIENT
Start: 2018-03-18 | End: 2018-08-24 | Stop reason: SDUPTHER

## 2018-04-16 ENCOUNTER — TELEPHONE (OUTPATIENT)
Dept: FAMILY MEDICINE CLINIC | Age: 53
End: 2018-04-16

## 2018-04-16 NOTE — TELEPHONE ENCOUNTER
This nurse spoke to this patient. The patient has not scheduled an appointment yet.   Patient stated that when she goes to her scheduled appointment she will have the report sent to [address], LPN

## 2018-08-03 ENCOUNTER — OFFICE VISIT (OUTPATIENT)
Dept: FAMILY MEDICINE CLINIC | Age: 53
End: 2018-08-03

## 2018-08-03 VITALS
HEIGHT: 68 IN | SYSTOLIC BLOOD PRESSURE: 136 MMHG | TEMPERATURE: 98.3 F | BODY MASS INDEX: 29.55 KG/M2 | HEART RATE: 88 BPM | OXYGEN SATURATION: 95 % | WEIGHT: 195 LBS | RESPIRATION RATE: 14 BRPM | DIASTOLIC BLOOD PRESSURE: 100 MMHG

## 2018-08-03 DIAGNOSIS — I10 ESSENTIAL HYPERTENSION: ICD-10-CM

## 2018-08-03 DIAGNOSIS — Z00.00 ANNUAL PHYSICAL EXAM: ICD-10-CM

## 2018-08-03 DIAGNOSIS — R73.03 PRE-DIABETES: Primary | ICD-10-CM

## 2018-08-03 DIAGNOSIS — E78.5 HYPERLIPIDEMIA, UNSPECIFIED HYPERLIPIDEMIA TYPE: ICD-10-CM

## 2018-08-03 LAB — HBA1C MFR BLD HPLC: 5.5 %

## 2018-08-03 RX ORDER — METFORMIN HYDROCHLORIDE 500 MG/1
TABLET ORAL
Qty: 90 TAB | Refills: 1 | Status: SHIPPED | OUTPATIENT
Start: 2018-08-03 | End: 2018-11-07

## 2018-08-03 NOTE — PROGRESS NOTES
Chief Complaint   Patient presents with    Medication Refill     Patient is here for medication evaluation and refill.

## 2018-08-03 NOTE — ACP (ADVANCE CARE PLANNING)
Pinky Ramos 50 y.o. Pinky Ramos female presents for annual exam.   
LMP: very light monthly. Last full period was a year ago. Screening:  
 
Colonoscopy:  Still has order, will call to schedule. Mammogram:  Will call to order Pap:  2014. Chronic issues: 
 has a past medical history of Diabetes ([address] Essential hypertension; and Pre-diabetes. Diet & Exercise: 
Diet: see below. Exercise:  Walking regularly. HTN Noah Harrell is a 50 y.o. female with hypertension. Hypertension ROS: taking medications as instructed, no medication side effects noted, no TIA's, no chest pain on exertion, no dyspnea on exertion, no swelling of ankles. New concerns: pt states she is very anxious about her tooth procedure she has later today. Thinks this is elevating blood pressure. Pre-diabetes Patient with history of pre-diabetes. Has been working on diet; has started eating a lot of fruits and vegetables. She has cut back on fried and fatty foods. Eats more grilled foods. Cut back on sodas as well. Drinking a lot more water now too. Hyperlipidemia: 
Patient with [facility] hyperlipidemia. Currently taking no medication, diet controlled. Diet/exercise: see above Tobacco use: cutting back, down to less than 1/2 ppd. Denies chest pain, shortness of breath, dsypnea. Labs last checked Jan 2018. Allergies Allergen Reactions  Codeine Hives  Keflex [Cephalexin] Unknown (comments)  Morphine Hives  Pcn [Penicillins] Shortness of Breath  Sulfa (Sulfonamide Antibiotics) Hives Current Outpatient Prescriptions:  
  metFORMIN (GLUCOPHAGE) 500 mg tablet, TAKE 1 TABLET BY MOUTH  DAILY (WITH MEALS). , Disp: 90 Tab, Rfl: 1 
  losartan (COZAAR) 25 mg tablet, TAKE 1 TABLET DAILY, Disp: 90 Tab, Rfl: 3 
  HYDROcodone-acetaminophen (LORTAB 5-325) 5-325 mg per tablet, Take 1 Tab by mouth every six (6) hours as needed for Pain. Max Daily Amount: 4 Tabs., Disp: 12 Tab, Rfl: 0 
 
 
ROS Gen: denies fever, chills, fatigue, weight loss, weight gain Eyes: denies blurry vision Nose: denies nasal congestion Mouth: denies sore throat Resp: denies dypsnea, cough, wheezing CV: denies chest pain Abd: denies nausea, vomiting, diarrhea, constipation Neuro: denies numbness/tingling Visit Vitals  BP (!) 136/100 (BP 1 Location: Left arm, BP Patient Position: Sitting)  Pulse 88  Temp 98.3 °F (36.8 °C) (Oral)  Resp 14  
 Ht 5' 8\" (1.727 m)  Wt 195 lb (88.5 kg)  SpO2 95%  BMI 29.65 kg/m2 Gen: alert, oriented, no acute distress Head: NCAT Eyes: PERRLA, sclera clear, conjunctiva clear Nose: normal turbinates, no rhinorrhea, no sinus tenderness Oral: moist mucus membranes, no oral lesions, no pharyngeal inflammation or exudate Neck: supple, midline trachea, no retractions. Thyroid WNL. Resp: Lungs CTAB, no wheezing, rales or rhonchi CV: Normal rhythm, normal S1/S2, no m/r/g. No LE edema Abd: soft, not tender, not distended. No hepatosplenomegaly. Normal bowel sounds. MSK: normal strength and movement in all extremities, reflexes age appropriate Skin: no lesion or rash Diagnoses and all orders for this visit: 1. Pre-diabetes -     AMB POC HEMOGLOBIN A1C 
-     metFORMIN (GLUCOPHAGE) 500 mg tablet; TAKE 1 TABLET BY MOUTH  DAILY (WITH MEALS). -     CBC WITH AUTOMATED DIFF 
-     METABOLIC PANEL, COMPREHENSIVE 
-     LIPID PANEL 2. Essential hypertension 
-     CBC WITH AUTOMATED DIFF 
-     METABOLIC PANEL, COMPREHENSIVE 
-     LIPID PANEL 
 
bp recheck in 2 weeks when she does not have a procedure scheduled. Continue current meds, may need to adjust.  
 
3. Hyperlipidemia, unspecified hyperlipidemia type 
-lipid panel today 
-stable on meds 
-continue diet/exercise changes 
-cont cutting back on [employer] [facility] Maintenance reviewed - patient asked to schedule her pap smear, patient asked to schedule her mammogram, asked pt to schedule her colonoscopy. BMI 29 Healthy balanced diet low in carbohydrates, saturated fats and red meats and rich in fiber, protein, fruits and vegetables recommended. Recommended 30 minutes cardiovascular exercise such as brisk walking/running at least 3-5x a week. Verbal and written instructions (see AVS) provided.  Patient expresses understanding of diagnosis and treatment plan.  
 
Antonieta Marsh MD

## 2018-08-03 NOTE — PROGRESS NOTES
Adrien Esparza 50 y.o. Adrien Esparza female presents for annual exam.    LMP: very light monthly. Last full period was a year ago.       Screening:      Colonoscopy:  Still has order, will call to schedule. Mammogram:  Will call to order  Pap:  2014.       Chronic issues:   has a past medical history of Diabetes ([address] Essential hypertension; and Pre-diabetes.     Diet & Exercise:  Diet: see below. Exercise:  Walking regularly.       HTN  Abel Mckeon is a 50 y.o. female with hypertension. Hypertension ROS: taking medications as instructed, no medication side effects noted, no TIA's, no chest pain on exertion, no dyspnea on exertion, no swelling of ankles. New concerns: pt states she is very anxious about her tooth procedure she has later today. Thinks this is elevating blood pressure.      Pre-diabetes  Patient with history of pre-diabetes. Has been working on diet; has started eating a lot of fruits and vegetables. She has cut back on fried and fatty foods. Eats more grilled foods. Cut back on sodas as well. Drinking a lot more water now too. Hyperlipidemia:  Patient with PMH hyperlipidemia. Currently taking no medication, diet controlled. Diet/exercise: see above  Tobacco use: cutting back, down to less than 1/2 ppd. Denies chest pain, shortness of breath, dsypnea. Labs last checked Jan 2018.             Allergies   Allergen Reactions    Codeine Hives    Keflex [Cephalexin] Unknown (comments)    Morphine Hives    Pcn [Penicillins] Shortness of Breath    Sulfa (Sulfonamide Antibiotics) Hives         Current Outpatient Prescriptions:     metFORMIN (GLUCOPHAGE) 500 mg tablet, TAKE 1 TABLET BY MOUTH  DAILY (WITH MEALS). , Disp: 90 Tab, Rfl: 1    losartan (COZAAR) 25 mg tablet, TAKE 1 TABLET DAILY, Disp: 90 Tab, Rfl: 3    HYDROcodone-acetaminophen (LORTAB 5-325) 5-325 mg per tablet, Take 1 Tab by mouth every six (6) hours as needed for Pain.  Max Daily Amount: 4 Tabs., Disp: 12 Tab, Rfl: 0        ROS  Gen: denies fever, chills, fatigue, weight loss, weight gain  Eyes: denies blurry vision  Nose: denies nasal congestion  Mouth: denies sore throat  Resp: denies dypsnea, cough, wheezing  CV: denies chest pain  Abd: denies nausea, vomiting, diarrhea, constipation  Neuro: denies numbness/tingling          Visit Vitals    BP (!) 136/100 (BP 1 Location: Left arm, BP Patient Position: Sitting)    Pulse 88    Temp 98.3 °F (36.8 °C) (Oral)    Resp 14    Ht 5' 8\" (1.727 m)    Wt 195 lb (88.5 kg)    SpO2 95%    BMI 29.65 kg/m2      Gen: alert, oriented, no acute distress  Head: NCAT  Eyes: PERRLA, sclera clear, conjunctiva clear  Nose: normal turbinates, no rhinorrhea, no sinus tenderness  Oral: moist mucus membranes, no oral lesions, no pharyngeal inflammation or exudate  Neck: supple, midline trachea, no retractions. Thyroid WNL. Resp: Lungs CTAB, no wheezing, rales or rhonchi  CV: Normal rhythm, normal S1/S2, no m/r/g. No LE edema  Abd: soft, not tender, not distended. No hepatosplenomegaly. Normal bowel sounds. MSK: normal strength and movement in all extremities, reflexes age appropriate  Skin: no lesion or rash     Diagnoses and all orders for this visit:     1. Pre-diabetes  -     AMB POC HEMOGLOBIN A1C  -     metFORMIN (GLUCOPHAGE) 500 mg tablet; TAKE 1 TABLET BY MOUTH  DAILY (WITH MEALS). -     CBC WITH AUTOMATED DIFF  -     METABOLIC PANEL, COMPREHENSIVE  -     LIPID PANEL     2. Essential hypertension  -     CBC WITH AUTOMATED DIFF  -     METABOLIC PANEL, COMPREHENSIVE  -     LIPID PANEL     bp recheck in 2 weeks when she does not have a procedure scheduled. Continue current meds, may need to adjust.      3.  Hyperlipidemia, unspecified hyperlipidemia type  -lipid panel today  -stable on meds  -continue diet/exercise changes  -cont cutting back on cigarrettes        Health Maintenance reviewed - patient asked to schedule her pap smear, patient asked to schedule her mammogram, asked pt to schedule her colonoscopy.     BMI 29  Healthy balanced diet low in carbohydrates, saturated fats and red meats and rich in fiber, protein, fruits and vegetables recommended.      Recommended 30 minutes cardiovascular exercise such as brisk walking/running at least 3-5x a week.     Verbal and written instructions (see AVS) provided.  Patient expresses understanding of diagnosis and treatment plan.     Vibha White MD

## 2018-08-04 LAB
ALBUMIN SERPL-MCNC: 4.3 G/DL (ref 3.5–5.5)
ALBUMIN/GLOB SERPL: 2 {RATIO} (ref 1.2–2.2)
ALP SERPL-CCNC: 91 IU/L (ref 39–117)
ALT SERPL-CCNC: 11 IU/L (ref 0–32)
AST SERPL-CCNC: 13 IU/L (ref 0–40)
BASOPHILS # BLD AUTO: 0 X10E3/UL (ref 0–0.2)
BASOPHILS NFR BLD AUTO: 0 %
BILIRUB SERPL-MCNC: 0.4 MG/DL (ref 0–1.2)
BUN SERPL-MCNC: 9 MG/DL (ref 6–24)
BUN/CREAT SERPL: 13 (ref 9–23)
CALCIUM SERPL-MCNC: 9.4 MG/DL (ref 8.7–10.2)
CHLORIDE SERPL-SCNC: 102 MMOL/L (ref 96–106)
CHOLEST SERPL-MCNC: 203 MG/DL (ref 100–199)
CO2 SERPL-SCNC: 23 MMOL/L (ref 20–29)
CREAT SERPL-MCNC: 0.71 MG/DL (ref 0.57–1)
EOSINOPHIL # BLD AUTO: 0.3 X10E3/UL (ref 0–0.4)
EOSINOPHIL NFR BLD AUTO: 3 %
ERYTHROCYTE [DISTWIDTH] IN BLOOD BY AUTOMATED COUNT: 14.7 % (ref 12.3–15.4)
GLOBULIN SER CALC-MCNC: 2.2 G/DL (ref 1.5–4.5)
GLUCOSE SERPL-MCNC: 106 MG/DL (ref 65–99)
HCT VFR BLD AUTO: 43.4 % (ref 34–46.6)
HDLC SERPL-MCNC: 36 MG/DL
HGB BLD-MCNC: 13.9 G/DL (ref 11.1–15.9)
IMM GRANULOCYTES # BLD: 0 X10E3/UL (ref 0–0.1)
IMM GRANULOCYTES NFR BLD: 0 %
INTERPRETATION, 910389: NORMAL
LDLC SERPL CALC-MCNC: 128 MG/DL (ref 0–99)
LYMPHOCYTES # BLD AUTO: 3.5 X10E3/UL (ref 0.7–3.1)
LYMPHOCYTES NFR BLD AUTO: 35 %
MCH RBC QN AUTO: 27.1 PG (ref 26.6–33)
MCHC RBC AUTO-ENTMCNC: 32 G/DL (ref 31.5–35.7)
MCV RBC AUTO: 85 FL (ref 79–97)
MONOCYTES # BLD AUTO: 0.7 X10E3/UL (ref 0.1–0.9)
MONOCYTES NFR BLD AUTO: 7 %
NEUTROPHILS # BLD AUTO: 5.5 X10E3/UL (ref 1.4–7)
NEUTROPHILS NFR BLD AUTO: 55 %
PLATELET # BLD AUTO: 201 X10E3/UL (ref 150–379)
POTASSIUM SERPL-SCNC: 4.3 MMOL/L (ref 3.5–5.2)
PROT SERPL-MCNC: 6.5 G/DL (ref 6–8.5)
RBC # BLD AUTO: 5.12 X10E6/UL (ref 3.77–5.28)
SODIUM SERPL-SCNC: 140 MMOL/L (ref 134–144)
TRIGL SERPL-MCNC: 195 MG/DL (ref 0–149)
VLDLC SERPL CALC-MCNC: 39 MG/DL (ref 5–40)
WBC # BLD AUTO: 9.9 X10E3/UL (ref 3.4–10.8)

## 2018-08-24 ENCOUNTER — CLINICAL SUPPORT (OUTPATIENT)
Dept: FAMILY MEDICINE CLINIC | Age: 53
End: 2018-08-24

## 2018-08-24 VITALS
WEIGHT: 195 LBS | BODY MASS INDEX: 29.55 KG/M2 | HEART RATE: 87 BPM | HEIGHT: 68 IN | OXYGEN SATURATION: 95 % | RESPIRATION RATE: 12 BRPM | DIASTOLIC BLOOD PRESSURE: 94 MMHG | TEMPERATURE: 98 F | SYSTOLIC BLOOD PRESSURE: 137 MMHG

## 2018-08-24 DIAGNOSIS — I10 ESSENTIAL HYPERTENSION: Primary | ICD-10-CM

## 2018-08-24 RX ORDER — LOSARTAN POTASSIUM 50 MG/1
50 TABLET ORAL DAILY
Qty: 30 TAB | Refills: 1 | Status: SHIPPED | OUTPATIENT
Start: 2018-08-24 | End: 2019-02-27

## 2018-08-24 NOTE — PROGRESS NOTES
Chief Complaint   Patient presents with    Blood Pressure Check     Patient is here for a BP check.

## 2018-08-24 NOTE — PROGRESS NOTES
Subjective:     Azeb Art is a 50 y.o. female who presents today with the following:  Chief Complaint   Patient presents with    Blood Pressure Check     HTN  Azeb Art is a 50 y.o. female with hypertension. Hypertension ROS: taking medications as instructed, no medication side effects noted, no TIA's, no chest pain on exertion, no dyspnea on exertion, no swelling of ankles. New concerns: bp running high at home as well, diastolic in 638P. Patient states she is walking more now. ROS:  Gen: denies fever, chills, fatigue, weight loss, weight gain  HEENT:denies blurry vision, nasal congestion, sore throat  Resp: denies dypsnea, cough, wheezing  CV: denies chest pain, palpitations, lower extremity edema  Abd: denies nausea, vomiting, diarrhea, constipation  Neuro: denies numbness/tingling  Endo: denies polyuria, polydipsia, heat/cold intolerance  Heme: no lymphadenopathy    Allergies   Allergen Reactions    Codeine Hives    Keflex [Cephalexin] Unknown (comments)    Morphine Hives    Pcn [Penicillins] Shortness of Breath    Sulfa (Sulfonamide Antibiotics) Hives         Current Outpatient Prescriptions:     losartan (COZAAR) 50 mg tablet, Take 1 Tab by mouth daily. , Disp: 30 Tab, Rfl: 1    metFORMIN (GLUCOPHAGE) 500 mg tablet, TAKE 1 TABLET BY MOUTH  DAILY (WITH MEALS). , Disp: 90 Tab, Rfl: 1    HYDROcodone-acetaminophen (LORTAB 5-325) 5-325 mg per tablet, Take 1 Tab by mouth every six (6) hours as needed for Pain.  Max Daily Amount: 4 Tabs., Disp: 12 Tab, Rfl: 0    Past Medical History:   Diagnosis Date    Diabetes ([facility] [address]     gestational    Essential hypertension     Pre-diabetes        Past Surgical History:   Procedure Laterality Date    HX  SECTION      HX CHOLECYSTECTOMY      HX GYN      ovarian cyst    HX HEENT      tonsillectomy and adenoidectomy    HX ORTHOPAEDIC      foot sx    HX TUBAL LIGATION         History   Smoking Status    Former Smoker    Quit date: 1/19/2018   Smokeless Tobacco    Never Used       Social History     Social History    Marital status: SINGLE     Spouse name: N/A    Number of children: N/A    Years of education: N/A     Occupational History          Social History Main Topics    Smoking status: Former Smoker     Quit date: 1/19/2018    Smokeless tobacco: Never Used    Alcohol use No    Drug use: Not on file    Sexual activity: Not on file     Other Topics Concern    Not on file     Social History Narrative       Family History   Problem Relation Age of Onset    Heart Disease Mother     Arthritis-rheumatoid Mother     Kidney Disease Father          Objective:     Visit Vitals    BP (!) 137/94    Pulse 87    Temp 98 °F (36.7 °C) (Oral)    Resp 12    Ht 5' 8\" (1.727 m)    Wt 195 lb (88.5 kg)    SpO2 95%    BMI 29.65 kg/m2     Gen: alert, oriented, no acute distress  Head: normocephalic, atraumatic  Eyes:sclera clear, conjunctiva clear  Oral: moist mucus membranes, no oral lesions, no pharyngeal exudate, no pharyngeal erythema  Resp: Normal work of breathing, lungs CTAB, no w/r/r  CV: S1, S2 normal.  No murmurs, rubs, or gallops. Extremities: no edema    No results found for this visit on 08/24/18. Assessment/ Plan:   Diagnoses and all orders for this visit:    1. Essential hypertension  -     losartan (COZAAR) 50 mg tablet; Take 1 Tab by mouth daily. increase Cozaar to 50 mg, follow up 1 month for bp check. Verbal and written instructions (see AVS) provided.  Patient expresses understanding of diagnosis and treatment plan. Sav Patterson.  Kamala Singh MD

## 2018-09-28 ENCOUNTER — CLINICAL SUPPORT (OUTPATIENT)
Dept: FAMILY MEDICINE CLINIC | Age: 53
End: 2018-09-28

## 2018-09-28 VITALS
TEMPERATURE: 98.6 F | HEART RATE: 79 BPM | RESPIRATION RATE: 16 BRPM | BODY MASS INDEX: 29.1 KG/M2 | SYSTOLIC BLOOD PRESSURE: 133 MMHG | OXYGEN SATURATION: 96 % | DIASTOLIC BLOOD PRESSURE: 87 MMHG | HEIGHT: 68 IN | WEIGHT: 192 LBS

## 2018-09-28 DIAGNOSIS — I10 ESSENTIAL HYPERTENSION: Primary | ICD-10-CM

## 2018-09-28 NOTE — MR AVS SNAPSHOT
[address] [facility] [address] 
 
 
 [address], [facility] [address] [facility] [address] 
[phone] Patient: Jolynn Bland MRN:  [id number] Visit Information Date & Time Provider Department Dept. Phone Encounter #  
 2018  8:35 AM MD Cosme Vital [address] [phone] [id number] Your Appointments 2018  8:30 AM  
ROUTINE CARE with MD Cosme Vital [address] ([facility]) Appt Note: 3 month f/u bp $25cp [facility] [address], [address] [facility] [address]  
[phone]  
  
   
 [address], [address] [address] Upcoming Health Maintenance Date Due DTaP/Tdap/Td series (1 - Tdap) 1988 PAP AKA CERVICAL CYTOLOGY 8/15/2017 Shingrix Vaccine Age 50> (1 of 2) 2017 BREAST CANCER SCRN MAMMOGRAM 2017 FOBT Q 1 YEAR AGE 50-75 2017 Influenza Age 5 to Adult 3/31/2019* *Topic was postponed. The date shown is not the original due date. Allergies as of 2018  Review Complete On: 2018 By: Pepe Gagnon Severity Noted Reaction Type Reactions Codeine High 02/15/2010   Systemic Hives Cranberry Fruit High 2018   Intolerance Hives Keflex [Cephalexin]  02/15/2010    Unknown (comments) Morphine  02/15/2010    Hives Pcn [Penicillins]  02/15/2010    Shortness of Breath Sulfa (Sulfonamide Antibiotics)  02/15/2010    Hives Current Immunizations  Never Reviewed No immunizations on file. Not reviewed this visit Vitals BP Pulse Temp Resp Height(growth percentile) Weight(growth percentile) 133/87 (BP 1 Location: Right arm, BP Patient Position: Sitting) 79 98.6 °F (37 °C) (Oral) 16 5' 8\" (1.727 m) 192 lb (87.1 kg) SpO2 BMI OB Status Smoking Status 96% 29.19 kg/m2 Premenopausal Former Smoker Vitals History BMI and BSA Data Body Mass Index Body Surface Area 29.19 kg/m 2 2.04 m 2 Preferred Pharmacy Pharmacy Name Phone Neel[facility]oper, [facility] [phone] Your Updated Medication List  
  
   
This list is accurate as of 9/28/18  8:49 AM.  Always use your most recent med list.  
  
  
  
  
 HYDROcodone-acetaminophen 5-325 mg per tablet Commonly known as:  LORTAB 5-325 Take 1 Tab by mouth every six (6) hours as needed for Pain. Max Daily Amount: 4 Tabs. losartan 50 mg tablet Commonly known as:  COZAAR Take 1 Tab by mouth daily. metFORMIN 500 mg tablet Commonly known as:  GLUCOPHAGE  
TAKE 1 TABLET BY MOUTH  DAILY (WITH MEALS). Introducing [facility]! Sherif Crum introduces NoteSick patient portal. Now you can access parts of your medical record, email your doctor's office, and request medication refills online. 1. In your internet browser, go to [email or web address] 2. Click on the First Time User? Click Here link in the Sign In box. You will see the New Member Sign Up page. 3. Enter your NoteSick Access Code exactly as it appears below. You will not need to use this code after youve completed the sign-up process. If you do not sign up before the expiration date, you must request a new code. · NoteSick Access Code: [email or web address] Expires: 11/1/2018  7:37 AM 
 
4. Enter the last four digits of your Social Security Number (xxxx) and Date of Birth (mm/dd/yyyy) as indicated and click Submit. You will be taken to the next sign-up page. 5. Create a [employer] ID. This will be your NoteSick login ID and cannot be changed, so think of one that is secure and easy to remember. 6. Create a NoteSick password. You can change your password at any time. 7. Enter your Password Reset Question and Answer. This can be used at a later time if you forget your password. 8. Enter your e-mail address.  You will receive e-mail notification when new information is available in Toutpost. 9. Click Sign Up. You can now view and download portions of your medical record. 10. Click the Download Summary menu link to download a portable copy of your medical information. If you have questions, please visit the Frequently Asked Questions section of the Toutpost website. Remember, Toutpost is NOT to be used for urgent needs. For medical emergencies, dial 911. Now available from your iPhone and Android! Please provide this summary of care documentation to your next provider. Your primary care clinician is listed as Maryjo Carpio. If you have any questions after today's visit, please call [phone].

## 2018-11-07 ENCOUNTER — OFFICE VISIT (OUTPATIENT)
Dept: FAMILY MEDICINE CLINIC | Age: 53
End: 2018-11-07

## 2018-11-07 VITALS
BODY MASS INDEX: 29.1 KG/M2 | SYSTOLIC BLOOD PRESSURE: 136 MMHG | HEART RATE: 77 BPM | TEMPERATURE: 98.3 F | RESPIRATION RATE: 12 BRPM | WEIGHT: 192 LBS | HEIGHT: 68 IN | OXYGEN SATURATION: 96 % | DIASTOLIC BLOOD PRESSURE: 92 MMHG

## 2018-11-07 DIAGNOSIS — M25.511 ACUTE PAIN OF RIGHT SHOULDER: ICD-10-CM

## 2018-11-07 DIAGNOSIS — E78.49 OTHER HYPERLIPIDEMIA: ICD-10-CM

## 2018-11-07 DIAGNOSIS — I10 ESSENTIAL HYPERTENSION: ICD-10-CM

## 2018-11-07 DIAGNOSIS — R73.03 PRE-DIABETES: Primary | ICD-10-CM

## 2018-11-07 LAB — HBA1C MFR BLD HPLC: 5.4 %

## 2018-11-07 RX ORDER — LANOLIN ALCOHOL/MO/W.PET/CERES
1000 CREAM (GRAM) TOPICAL DAILY
COMMUNITY
End: 2020-07-20 | Stop reason: ALTCHOICE

## 2018-11-07 RX ORDER — METHYLPREDNISOLONE 4 MG/1
TABLET ORAL
Qty: 1 DOSE PACK | Refills: 0 | Status: SHIPPED | OUTPATIENT
Start: 2018-11-07 | End: 2018-11-28 | Stop reason: ALTCHOICE

## 2018-11-07 RX ORDER — HYDROCHLOROTHIAZIDE 12.5 MG/1
12.5 TABLET ORAL DAILY
Qty: 30 TAB | Refills: 0 | Status: SHIPPED | OUTPATIENT
Start: 2018-11-07 | End: 2019-02-27

## 2018-11-07 NOTE — PROGRESS NOTES
HISTORY OF PRESENT ILLNESS  Evan Cui is a 48 y.o. female. High Blood Sugar     1. Have you been to the ER, urgent care clinic since your last visit? Hospitalized since your last visit? No  2. Have you seen or consulted any other health care providers outside of the [address] [place] [address] since your last visit? Include any pap smears or colon screening.  No

## 2018-11-07 NOTE — PROGRESS NOTES
Subjective:     Katlyn Mcleod is a 48 y.o. female who presents today with the following:  Chief Complaint   Patient presents with    High Blood Sugar     HTN:   Katlyn Mcleod is a 48 y.o. female with hypertension. Hypertension ROS: taking medications as instructed, no medication side effects noted, no TIA's, no chest pain on exertion, no dyspnea on exertion, no swelling of ankles. New concerns: BP running high. Taking medication as directed. Pre-diabetes  Patient with history of elevated A1c, last A1c was 5.5 in August.   Patient has continued a diabetic healthy diet. Has cut out sugar except for sugar in coffee. Has cut out potatoes, eating a lot more protein. R shoulder pain  Patient with R shoulder pain most noticeable when putting on her jacket. Pain is not constant. Pain has been present for the last 2 weeks. Also more noticeable with sleeping. 3rd and 4th fingers are going numb occasionally. At times pain will radiate from shoulder into elbow. Wt Readings from Last 3 Encounters:   11/07/18 192 lb (87.1 kg)   09/28/18 192 lb (87.1 kg)   08/24/18 195 lb (88.5 kg)       ROS:  Gen: denies fever, chills, fatigue, weight loss, weight gain  HEENT:denies blurry vision, nasal congestion, sore throat  Resp: denies dypsnea, cough, wheezing  CV: denies chest pain, palpitations, lower extremity edema  Abd: denies nausea, vomiting, diarrhea, constipation  Neuro: denies numbness/tingling  Endo: denies polyuria, polydipsia, heat/cold intolerance  Heme: no lymphadenopathy    Allergies   Allergen Reactions    Codeine Hives    Cranberry Fruit Hives    Keflex [Cephalexin] Unknown (comments)    Morphine Hives    Pcn [Penicillins] Shortness of Breath    Sulfa (Sulfonamide Antibiotics) Hives         Current Outpatient Medications:     cyanocobalamin 1,000 mcg tablet, Take 1,000 mcg by mouth daily. , Disp: , Rfl:     hydroCHLOROthiazide (HYDRODIURIL) 12.5 mg tablet, Take 1 Tab by mouth daily. , Disp: 30 Tab, Rfl: 0    methylPREDNISolone (MEDROL DOSEPACK) 4 mg tablet, Take as directed on package. , Disp: 1 Dose Pack, Rfl: 0    losartan (COZAAR) 50 mg tablet, Take 1 Tab by mouth daily. , Disp: 30 Tab, Rfl: 1    HYDROcodone-acetaminophen (LORTAB 5-325) 5-325 mg per tablet, Take 1 Tab by mouth every six (6) hours as needed for Pain.  Max Daily Amount: 4 Tabs., Disp: 12 Tab, Rfl: 0    Past Medical History:   Diagnosis Date    Diabetes ([facility] [address]     gestational    Essential hypertension     Pre-diabetes        Past Surgical History:   Procedure Laterality Date    HX  SECTION      HX CHOLECYSTECTOMY      HX GYN      ovarian cyst    HX HEENT      tonsillectomy and adenoidectomy    HX ORTHOPAEDIC  2001    foot sx    HX TUBAL LIGATION         Social History     Tobacco Use   Smoking Status Former Smoker    Last attempt to quit: 2018    Years since quittin.8   Smokeless Tobacco Never Used       Social History     Socioeconomic History    Marital status: SINGLE     Spouse name: Not on file    Number of children: Not on file    Years of education: Not on file    Highest education level: Not on file   Social Needs    Financial resource strain: Not on file    Food insecurity - worry: Not on file    Food insecurity - inability: Not on file   [employer] needs - medical: Not on file   [employer] needs - non-medical: Not on file   Occupational History    Occupation: [employer]   Tobacco Use    Smoking status: Former Smoker     Last attempt to quit: 2018     Years since quittin.8    Smokeless tobacco: Never Used   Substance and Sexual Activity    Alcohol use: No    Drug use: Not on file    Sexual activity: Not on file   Other Topics Concern    Not on file   Social History Narrative    Not on file       Family History   Problem Relation Age of Onset    Heart Disease Mother     Arthritis-rheumatoid Mother     Kidney Disease Father Objective:     Visit Vitals  BP (!) 136/92 (BP 1 Location: Left arm, BP Patient Position: Sitting)   Pulse 77   Temp 98.3 °F (36.8 °C) (Oral)   Resp 12   Ht 5' 8\" (1.727 m)   Wt 192 lb (87.1 kg)   SpO2 96%   BMI 29.19 kg/m²     Gen: alert, oriented, no acute distress  Head: normocephalic, atraumatic  Eyes:sclera clear, conjunctiva clear  Oral: moist mucus membranes, no oral lesions, no pharyngeal exudate, no pharyngeal erythema  Neck: symmetric normal sized thyroid, no carotid bruits, no JVD  Resp: Normal work of breathing, lungs CTAB, no w/r/r  CV: S1, S2 normal.  No murmurs, rubs, or gallops. Abd:  Normal bowel sounds. Soft, not tender, not distended. Skin: no rash             Extremities: no edema  MSK: strength 5/5 UE.  ROM limited by pain on external and internal rotation of R shoulder    Results for orders placed or performed in visit on 11/07/18   AMB POC HEMOGLOBIN A1C   Result Value Ref Range    Hemoglobin A1c (POC) 5.4 %       Assessment/ Plan:   Diagnoses and all orders for this visit:    1. Pre-diabetes  -     AMB POC HEMOGLOBIN A1C  -A1c consistently good  -ok to d/c metformin  -continue to monitor blood sugars    2. Essential hypertension  -     hydroCHLOROthiazide (HYDRODIURIL) 12.5 mg tablet; Take 1 Tab by mouth daily.  -  BP not at goal  -discussed goal BP: 120/80  -continue cozaar  -labs at next visit    3. Other hyperlipidemia  Healthy balanced diet low in carbohydrates, saturated fats and red meats and rich in fiber, protein, fruits and vegetables recommended. Recommended 30 minutes cardiovascular exercise such as brisk walking/running at [employer] 3-5x a week. 4. Acute pain of right shoulder  -     methylPREDNISolone (MEDROL DOSEPACK) 4 mg tablet; Take as directed on package.  -    Likely some level of nerve irritation given numbness/tingling and radiation of pain.    If no improvement after medrol dose pack, will consider imaging           Verbal and written instructions (see ERIC) provided.  Patient expresses understanding of diagnosis and treatment plan. Cyndee Session.  Alesha Pino MD

## 2018-11-28 ENCOUNTER — OFFICE VISIT (OUTPATIENT)
Dept: FAMILY MEDICINE CLINIC | Age: 53
End: 2018-11-28

## 2018-11-28 VITALS
TEMPERATURE: 98.6 F | DIASTOLIC BLOOD PRESSURE: 80 MMHG | BODY MASS INDEX: 28.04 KG/M2 | OXYGEN SATURATION: 98 % | SYSTOLIC BLOOD PRESSURE: 119 MMHG | WEIGHT: 185 LBS | HEART RATE: 99 BPM | HEIGHT: 68 IN | RESPIRATION RATE: 14 BRPM

## 2018-11-28 DIAGNOSIS — R73.03 PRE-DIABETES: ICD-10-CM

## 2018-11-28 DIAGNOSIS — S82.62XA CLOSED FRACTURE OF DISTAL LATERAL MALLEOLUS OF LEFT FIBULA, INITIAL ENCOUNTER: ICD-10-CM

## 2018-11-28 DIAGNOSIS — I10 ESSENTIAL HYPERTENSION: Primary | ICD-10-CM

## 2018-11-28 DIAGNOSIS — M25.531 RIGHT WRIST PAIN: ICD-10-CM

## 2018-11-28 RX ORDER — LOSARTAN POTASSIUM AND HYDROCHLOROTHIAZIDE 12.5; 5 MG/1; MG/1
1 TABLET ORAL DAILY
Qty: 90 TAB | Refills: 3 | Status: SHIPPED | OUTPATIENT
Start: 2018-11-28 | End: 2019-10-31 | Stop reason: SDUPTHER

## 2018-11-28 RX ORDER — METFORMIN HYDROCHLORIDE 500 MG/1
500 TABLET ORAL
Qty: 90 TAB | Refills: 3 | Status: SHIPPED | OUTPATIENT
Start: 2018-11-28 | End: 2019-02-27

## 2018-11-28 NOTE — PROGRESS NOTES
Chief Complaint   Patient presents with   [facility] ED Follow-up     [facility] on 11/9/18, Geovanna Beatty on 11/10/18     Patient fell and injured her ankle on 11/9/18.

## 2018-11-28 NOTE — PROGRESS NOTES
Subjective:     Shira Felix is a 48 y.o. female who presents today with the following:  Chief Complaint   Patient presents with    ED Follow-up     [facility] on 11/9/18, [facility] on 11/10/18     Patient here for follow up after diagnosis of L lateral malleolar fracture an R fracture of inferior patella after fall on her driveway as she was getting ready for work. Patient went directly to urgent care and had XR done which showed the above fractures. She was sent to [facility] and was told to not bear weight for 2 week on L. They did not think her patella was actually fractured so she has not needed a brace for her knee. She has follow up scheduled for Friday with orthopedics which she will attend. Pt currently on remote letter to work from home. Patient is not in pain, but is using tylenol alone. Was given Tramadol but has not used in about a week. Her R wrist is actually giving her the most pain. HTN:   Shira Felix is a 48 y.o. female with hypertension. Hypertension ROS: taking medications as instructed, no medication side effects noted, no TIA's, no chest pain on exertion, no dyspnea on exertion, no swelling of ankles. New concerns: would like to switch to combination pill. Pre-diabetes  Patient with history of pre-diabetes. Has been taking Metformin. At last visit A1c was 5.4, so we discussed coming off of Metformin as she had instituted lifestyle changes for over a year and was not having trouble maintaining this. Since her accident and fractures above patient has been unable to cook for herself so is worried her A1c will increase. She has continued taking metformin and wonders if she should just continue this long term.      ROS:  Gen: denies fever, chills, fatigue, weight loss, weight gain  HEENT:denies blurry vision, nasal congestion, sore throat  Resp: denies dypsnea, cough, wheezing  CV: denies chest pain, palpitations, lower extremity edema  Abd: denies nausea, vomiting, diarrhea, constipation  Neuro: denies numbness/tingling  Endo: denies polyuria, polydipsia, heat/cold intolerance  Heme: no lymphadenopathy    Allergies   Allergen Reactions    Codeine Hives    Cranberry Fruit Hives    Keflex [Cephalexin] Unknown (comments)    Morphine Hives    Pcn [Penicillins] Shortness of Breath    Sulfa (Sulfonamide Antibiotics) Hives         Current Outpatient Medications:     losartan-hydroCHLOROthiazide (HYZAAR) 50-12.5 mg per tablet, Take 1 Tab by mouth daily. , Disp: 90 Tab, Rfl: 3    metFORMIN (GLUCOPHAGE) 500 mg tablet, Take 1 Tab by mouth daily (with breakfast). , Disp: 90 Tab, Rfl: 3    cyanocobalamin 1,000 mcg tablet, Take 1,000 mcg by mouth daily. , Disp: , Rfl:     hydroCHLOROthiazide (HYDRODIURIL) 12.5 mg tablet, Take 1 Tab by mouth daily. , Disp: 30 Tab, Rfl: 0    losartan (COZAAR) 50 mg tablet, Take 1 Tab by mouth daily. , Disp: 30 Tab, Rfl: 1    HYDROcodone-acetaminophen (LORTAB 5-325) 5-325 mg per tablet, Take 1 Tab by mouth every six (6) hours as needed for Pain.  Max Daily Amount: 4 Tabs., Disp: 12 Tab, Rfl: 0    Past Medical History:   Diagnosis Date    Diabetes ([facility]     gestational    Essential hypertension     Pre-diabetes        Past Surgical History:   Procedure Laterality Date    HX  SECTION      HX CHOLECYSTECTOMY      HX GYN  1988    ovarian cyst    HX HEENT      tonsillectomy and adenoidectomy    HX ORTHOPAEDIC  2001    foot sx    HX TUBAL LIGATION         Social History     Tobacco Use   Smoking Status Former Smoker    Last attempt to quit: 2018    Years since quittin.8   Smokeless Tobacco Never Used       Social History     Socioeconomic History    Marital status: SINGLE     Spouse name: Not on file    Number of children: Not on file    Years of education: Not on file    Highest education level: Not on file   Occupational History    Occupation:    Tobacco Use    Smoking status: Former Smoker     Last attempt to quit: 2018     Years since quittin.8    Smokeless tobacco: Never Used   Substance and Sexual Activity    Alcohol use: No       Family History   Problem Relation Age of Onset    Heart Disease Mother     Arthritis-rheumatoid Mother     Kidney Disease Father          Objective:     Visit Vitals  /80 (BP 1 Location: Left arm, BP Patient Position: Sitting)   Pulse 99   Temp 98.6 °F (37 °C) (Oral)   Resp 14   Ht 5' 8\" (1.727 m)   Wt 185 lb (83.9 kg)   SpO2 98%   BMI 28.13 kg/m²     Gen: alert, oriented, no acute distress  Head: normocephalic, atraumatic  Eyes:sclera clear, conjunctiva clear  Oral: moist mucus membranes, no oral lesions, no pharyngeal exudate, no pharyngeal erythema  Neck: symmetric normal sized thyroid, no carotid bruits, no JVD  Resp: Normal work of breathing, lungs CTAB, no w/r/r  CV: S1, S2 normal.  No murmurs, rubs, or gallops. Abd:  Normal bowel sounds. Soft, not tender, not distended. Skin: no rash             Extremities: L ankle in boot  R wrist with normal ROM but some pain is elicited. No erythema or swelling noted. No results found for this visit on 18. Assessment/ Plan:   Diagnoses and all orders for this visit:    1. Essential hypertension  -     METABOLIC PANEL, COMPREHENSIVE  -     losartan-hydroCHLOROthiazide (HYZAAR) 50-12.5 mg per tablet; Take 1 Tab by mouth daily. -follow up 3 months for bp check    2. Pre-diabetes  -     metFORMIN (GLUCOPHAGE) 500 mg tablet; Take 1 Tab by mouth daily (with breakfast). -ok to continue taking, discussed that with her diet control she does not need it, but metformin can play a role in prevention as well. Pt wishes to continue taking medication. 3. Closed fracture of distal lateral malleolus of left fibula, initial encounter  -managed by orthopedics. Pt doing well overall. Has follow up on Friday.      4. Right wrist pain  -advised patient to use motrin TID with meals x 3 days to help with pain  -also advised wrist brace or wrapping      Verbal and written instructions (see AVS) provided.  Patient expresses understanding of diagnosis and treatment plan. Juma Alcantar.  Ninoska Bee MD

## 2018-11-29 LAB
ALBUMIN SERPL-MCNC: 4.5 G/DL (ref 3.5–5.5)
ALBUMIN/GLOB SERPL: 1.6 {RATIO} (ref 1.2–2.2)
ALP SERPL-CCNC: 93 IU/L (ref 39–117)
ALT SERPL-CCNC: 13 IU/L (ref 0–32)
AST SERPL-CCNC: 14 IU/L (ref 0–40)
BILIRUB SERPL-MCNC: 0.4 MG/DL (ref 0–1.2)
BUN SERPL-MCNC: 9 MG/DL (ref 6–24)
BUN/CREAT SERPL: 12 (ref 9–23)
CALCIUM SERPL-MCNC: 9.9 MG/DL (ref 8.7–10.2)
CHLORIDE SERPL-SCNC: 95 MMOL/L (ref 96–106)
CO2 SERPL-SCNC: 28 MMOL/L (ref 20–29)
CREAT SERPL-MCNC: 0.76 MG/DL (ref 0.57–1)
GLOBULIN SER CALC-MCNC: 2.8 G/DL (ref 1.5–4.5)
GLUCOSE SERPL-MCNC: 97 MG/DL (ref 65–99)
POTASSIUM SERPL-SCNC: 3.6 MMOL/L (ref 3.5–5.2)
PROT SERPL-MCNC: 7.3 G/DL (ref 6–8.5)
SODIUM SERPL-SCNC: 141 MMOL/L (ref 134–144)

## 2019-02-27 ENCOUNTER — OFFICE VISIT (OUTPATIENT)
Dept: FAMILY MEDICINE CLINIC | Age: 54
End: 2019-02-27

## 2019-02-27 VITALS
OXYGEN SATURATION: 96 % | DIASTOLIC BLOOD PRESSURE: 84 MMHG | HEART RATE: 85 BPM | TEMPERATURE: 98.1 F | HEIGHT: 68 IN | RESPIRATION RATE: 16 BRPM | BODY MASS INDEX: 27.55 KG/M2 | WEIGHT: 181.8 LBS | SYSTOLIC BLOOD PRESSURE: 118 MMHG

## 2019-02-27 DIAGNOSIS — R73.03 PRE-DIABETES: ICD-10-CM

## 2019-02-27 DIAGNOSIS — E78.5 HYPERLIPIDEMIA, UNSPECIFIED HYPERLIPIDEMIA TYPE: ICD-10-CM

## 2019-02-27 DIAGNOSIS — Z12.11 COLON CANCER SCREENING: ICD-10-CM

## 2019-02-27 DIAGNOSIS — I10 ESSENTIAL HYPERTENSION: ICD-10-CM

## 2019-02-27 DIAGNOSIS — Z00.00 ROUTINE GENERAL MEDICAL EXAMINATION AT A HEALTH CARE FACILITY: Primary | ICD-10-CM

## 2019-02-27 LAB — HBA1C MFR BLD HPLC: 5.8 %

## 2019-02-27 NOTE — PROGRESS NOTES
.  Chief Complaint   Patient presents with    Hypertension     . 1. Have you been to the ER, urgent care clinic since your last visit? Hospitalized since your last visit? no    2. Have you seen or consulted any other health care providers outside of the [address] [place] [address] since your last visit? Include any pap smears or colon screening. No    Health Maintenance Due   Topic Date Due    DTaP/Tdap/Td series (1 - Tdap) 09/27/1988    PAP AKA CERVICAL CYTOLOGY  08/15/2017    Shingrix Vaccine Age 50> (1 of 2) 09/27/2017    BREAST CANCER SCRN MAMMOGRAM  09/27/2017    FOBT Q 1 YEAR AGE 50-75  09/27/2017     . Elizabeth Diez

## 2019-02-27 NOTE — PROGRESS NOTES
ABHISHEK  Pilar Jones is a 48 y.o. female who presents follow-up on hypertension. Blood pressure is well controlled today. She is tolerating losartan-hydrochlorothiazide. She had prediabetes for which she was taking metformin 500 mg daily. Was doing very well for the last year and was told she could stop her Metformin at her last visit. She stopped it around mid December and feels like she has maintained her diet. She suffered a fall back in the early winter and has been dealing with various injuries or since. Everything is doing better except for her right shoulder which is quite painful. This is improving somewhat with physical therapy but she is having trouble pushing herself through the pain. Ultram constipates her. She is taking 2000 mg of Tylenol per day which is a little concerning to her. She is avoided all NSAIDs because of a family history of kidney problems (stones). She has had recurrent pyelonephritis but otherwise does not have any personal kidney issues    PMHx:  Past Medical History:   Diagnosis Date    [facility] ([facility]     gestational    Essential hypertension     Pre-diabetes        Meds:   Current Outpatient Medications   Medication Sig Dispense Refill    losartan-hydroCHLOROthiazide (HYZAAR) 50-12.5 mg per tablet Take 1 Tab by mouth daily. 90 Tab 3    cyanocobalamin 1,000 mcg tablet Take 1,000 mcg by mouth daily. Allergies:    Allergies   Allergen Reactions    Codeine Hives    Cranberry Fruit Hives    Keflex [Cephalexin] Unknown (comments)    Morphine Hives    Pcn [Penicillins] Shortness of Breath    Sulfa (Sulfonamide Antibiotics) Hives       Smoker:  Social History     Tobacco Use   Smoking Status Former Smoker    Last attempt to quit: 2018    Years since quittin.1   Smokeless Tobacco Never Used       ETOH:   Social History     Substance and Sexual Activity   Alcohol Use No       FH:   Family History   Problem Relation Age of Onset    Heart Disease Mother     Arthritis-rheumatoid Mother     Kidney Disease Father        ROS:   As listed in HPI. In addition:  Constitutional:   No headache, fever, fatigue, weight loss or weight gain      Cardiac:    No chest pain      Resp:   No cough or shortness of breath      Neuro   No loss of consciousness, dizziness, seizures      Physical Exam:  Blood pressure 118/84, pulse 85, temperature 98.1 °F (36.7 °C), resp. rate 16, height 5' 8\" (1.727 m), weight 181 lb 12.8 oz (82.5 kg), SpO2 96 %. GEN: No apparent distress. Alert and oriented and responds to all questions appropriately. NEUROLOGIC:  No focal neurologic deficits. Strength and sensation grossly intact. Coordination and gait grossly intact. EXT: Well perfused. No edema. SKIN: No obvious rashes. Lungs clear to auscultation bilaterally  CV regular rate and rhythm no murmur  HEENT clear tympanic membranes clear nasal mucosa clear oromucosa         Assessment and Plan     Hypertension  Well-controlled  Continue losartan-hydrochlorothiazide 50-12.5 mg    Prediabetes  Not currently taking metformin  A1c 5.8    Shoulder pain  She may use judicious and sparing NSAIDs. I would suggest to leave on her physical therapy days 3 days/week. Really needs to be working with physical therapy. Seeing orthopedics for this. Health maintenance  Pap smears at [address] [place] [address], both these were 2 years ago    Is here to colonoscopy. Thinks she might will set this up. Does not know she has a family history of colon cancer but her father required an ostomy for some reason    Follow-up 6 months      ICD-10-CM ICD-9-CM    1. Pre-diabetes R73.03 790.29 AMB POC HEMOGLOBIN A1C       AVS given.  Pt expressed understanding of instructions

## 2019-03-13 ENCOUNTER — TELEPHONE (OUTPATIENT)
Dept: FAMILY MEDICINE CLINIC | Age: 54
End: 2019-03-13

## 2019-03-13 NOTE — TELEPHONE ENCOUNTER
Patient returning phone call. She says if she cannot be reached tonight, she will not be able until after 5pm tomorrow.

## 2019-10-31 ENCOUNTER — OFFICE VISIT (OUTPATIENT)
Dept: FAMILY MEDICINE CLINIC | Age: 54
End: 2019-10-31

## 2019-10-31 VITALS
BODY MASS INDEX: 27.89 KG/M2 | HEART RATE: 79 BPM | WEIGHT: 184 LBS | TEMPERATURE: 98.9 F | HEIGHT: 68 IN | SYSTOLIC BLOOD PRESSURE: 107 MMHG | OXYGEN SATURATION: 95 % | RESPIRATION RATE: 18 BRPM | DIASTOLIC BLOOD PRESSURE: 73 MMHG

## 2019-10-31 DIAGNOSIS — R73.9 ELEVATED BLOOD SUGAR: ICD-10-CM

## 2019-10-31 DIAGNOSIS — I10 ESSENTIAL HYPERTENSION: ICD-10-CM

## 2019-10-31 DIAGNOSIS — Z00.00 ROUTINE GENERAL MEDICAL EXAMINATION AT A HEALTH CARE FACILITY: ICD-10-CM

## 2019-10-31 DIAGNOSIS — E78.2 MIXED HYPERLIPIDEMIA: Primary | ICD-10-CM

## 2019-10-31 RX ORDER — LOSARTAN POTASSIUM AND HYDROCHLOROTHIAZIDE 12.5; 5 MG/1; MG/1
1 TABLET ORAL DAILY
Qty: 90 TAB | Refills: 3 | Status: SHIPPED | OUTPATIENT
Start: 2019-10-31 | End: 2019-12-10 | Stop reason: RX

## 2019-10-31 NOTE — PROGRESS NOTES
Chief Complaint   Patient presents with    Physical     Health Maintenance reviewed     1. Have you been to the ER, urgent care clinic since your last visit? Hospitalized since your last visit? No    2. Have you seen or consulted any other health care providers outside of the [address] [place] [address] since your last visit? Include any pap smears or colon screening.  Yes, on file

## 2019-10-31 NOTE — PROGRESS NOTES
Chief Complaint   Patient presents with    Physical       Subjective:   48 y.o. female for Well Woman Check. Her gyne and breast care is done elsewhere by her Ob-Gyne physician. Patient Active Problem List   Diagnosis Code    Sinusitis J32.9    Essential hypertension I10    Pre-diabetes R73.03    Hyperlipidemia E78.5     Current Outpatient Medications   Medication Sig Dispense Refill    losartan-hydroCHLOROthiazide (HYZAAR) 50-12.5 mg per tablet Take 1 Tab by mouth daily. 90 Tab 3    cyanocobalamin 1,000 mcg tablet Take 1,000 mcg by mouth daily. Allergies   Allergen Reactions    Codeine Hives    Cranberry Fruit Hives    Keflex [Cephalexin] Unknown (comments)    Morphine Hives    Pcn [Penicillins] Shortness of Breath    Sulfa (Sulfonamide Antibiotics) Hives        Lab Results   Component Value Date/Time    WBC 9.9 08/03/2018 08:22 AM    HGB 13.9 08/03/2018 08:22 AM    HCT 43.4 08/03/2018 08:22 AM    PLATELET 777 49/93/7891 08:22 AM    MCV 85 08/03/2018 08:22 AM     Lab Results   Component Value Date/Time    Cholesterol, total 203 (H) 08/03/2018 08:22 AM    HDL Cholesterol 36 (L) 08/03/2018 08:22 AM    LDL, calculated 128 (H) 08/03/2018 08:22 AM    Triglyceride 195 (H) 08/03/2018 08:22 AM        ROS: Feeling generally well. No TIA's or unusual headaches, no dysphagia. No prolonged cough. No dyspnea or chest pain on exertion. No abdominal pain, change in bowel habits, black or bloody stools. No urinary tract symptoms. No new or unusual musculoskeletal symptoms. Specific concerns today: None at this time. Objective: The patient appears well, alert, oriented x 3, in no distress. Visit Vitals  /73 (BP 1 Location: Left arm, BP Patient Position: Sitting)   Pulse 79   Temp 98.9 °F (37.2 °C) (Oral)   Resp 18   Ht 5' 8\" (1.727 m)   Wt 184 lb (83.5 kg)   SpO2 95%   BMI 27.98 kg/m²     ENT normal.  Neck supple. No adenopathy or thyromegaly. KHUSHBOO.  Lungs are clear, good air entry, no wheezes, rhonchi or rales. S1 and S2 normal, no murmurs, regular rate and rhythm. Abdomen soft without tenderness, guarding, mass or organomegaly. Extremities show no edema, normal peripheral pulses. Neurological is normal, no focal findings. Breast and Pelvic exams are deferred. Assessment/Plan:   Well Woman  increase physical activity, routine labs ordered, call if any problems    ICD-10-CM ICD-9-CM    1. Mixed hyperlipidemia U80.3 160.2 METABOLIC PANEL, COMPREHENSIVE      LIPID PANEL   2. Essential hypertension I10 401.9 CBC W/O DIFF      TSH 3RD GENERATION      losartan-hydroCHLOROthiazide (HYZAAR) 50-12.5 mg per tablet   3. Routine general medical examination at a health care facility Z00.00 V70.0    4. Elevated blood sugar X12.0 582.01 METABOLIC PANEL, COMPREHENSIVE      HEMOGLOBIN A1C WITH EAG     Encounter Diagnoses   Name Primary?     Mixed hyperlipidemia Yes    Essential hypertension     Routine general medical examination at a health care facility     Elevated blood sugar      Orders Placed This Encounter    METABOLIC PANEL, COMPREHENSIVE    CBC W/O DIFF    LIPID PANEL    HEMOGLOBIN A1C WITH EAG    TSH 3RD GENERATION    losartan-hydroCHLOROthiazide (HYZAAR) 50-12.5 mg per tablet

## 2019-11-01 LAB
ALBUMIN SERPL-MCNC: 4.3 G/DL (ref 3.5–5.5)
ALBUMIN/GLOB SERPL: 1.7 {RATIO} (ref 1.2–2.2)
ALP SERPL-CCNC: 81 IU/L (ref 39–117)
ALT SERPL-CCNC: 17 IU/L (ref 0–32)
AST SERPL-CCNC: 16 IU/L (ref 0–40)
BILIRUB SERPL-MCNC: 0.5 MG/DL (ref 0–1.2)
BUN SERPL-MCNC: 7 MG/DL (ref 6–24)
BUN/CREAT SERPL: 10 (ref 9–23)
CALCIUM SERPL-MCNC: 9.4 MG/DL (ref 8.7–10.2)
CHLORIDE SERPL-SCNC: 99 MMOL/L (ref 96–106)
CHOLEST SERPL-MCNC: 222 MG/DL (ref 100–199)
CO2 SERPL-SCNC: 27 MMOL/L (ref 20–29)
CREAT SERPL-MCNC: 0.72 MG/DL (ref 0.57–1)
ERYTHROCYTE [DISTWIDTH] IN BLOOD BY AUTOMATED COUNT: 13.1 % (ref 12.3–15.4)
EST. AVERAGE GLUCOSE BLD GHB EST-MCNC: 111 MG/DL
GLOBULIN SER CALC-MCNC: 2.5 G/DL (ref 1.5–4.5)
GLUCOSE SERPL-MCNC: 86 MG/DL (ref 65–99)
HBA1C MFR BLD: 5.5 % (ref 4.8–5.6)
HCT VFR BLD AUTO: 41.1 % (ref 34–46.6)
HDLC SERPL-MCNC: 36 MG/DL
HGB BLD-MCNC: 14.4 G/DL (ref 11.1–15.9)
INTERPRETATION, 910389: NORMAL
LDLC SERPL CALC-MCNC: 148 MG/DL (ref 0–99)
MCH RBC QN AUTO: 29.6 PG (ref 26.6–33)
MCHC RBC AUTO-ENTMCNC: 35 G/DL (ref 31.5–35.7)
MCV RBC AUTO: 85 FL (ref 79–97)
PLATELET # BLD AUTO: 210 X10E3/UL (ref 150–450)
POTASSIUM SERPL-SCNC: 3.9 MMOL/L (ref 3.5–5.2)
PROT SERPL-MCNC: 6.8 G/DL (ref 6–8.5)
RBC # BLD AUTO: 4.86 X10E6/UL (ref 3.77–5.28)
SODIUM SERPL-SCNC: 142 MMOL/L (ref 134–144)
TRIGL SERPL-MCNC: 188 MG/DL (ref 0–149)
TSH SERPL DL<=0.005 MIU/L-ACNC: 1.44 UIU/ML (ref 0.45–4.5)
VLDLC SERPL CALC-MCNC: 38 MG/DL (ref 5–40)
WBC # BLD AUTO: 8.7 X10E3/UL (ref 3.4–10.8)

## 2019-11-02 NOTE — PROGRESS NOTES
Cholesterol is significantly elevated, recommend starting on a cholesterol lowering medication. Please advise if pt agrees  All other labs are within normal limits.    Please inform

## 2019-11-05 ENCOUNTER — TELEPHONE (OUTPATIENT)
Dept: FAMILY MEDICINE CLINIC | Age: 54
End: 2019-11-05

## 2019-11-05 RX ORDER — ATORVASTATIN CALCIUM 10 MG/1
10 TABLET, FILM COATED ORAL DAILY
Qty: 90 TAB | Refills: 2 | Status: SHIPPED | OUTPATIENT
Start: 2019-11-05 | End: 2020-02-20 | Stop reason: SDUPTHER

## 2019-11-05 NOTE — PROGRESS NOTES
verified. Patient notified of results. Patient is in agreement to starting a cholesterol medication and she would like for it to be sent to [facility].

## 2019-12-05 DIAGNOSIS — I10 ESSENTIAL HYPERTENSION: ICD-10-CM

## 2019-12-05 RX ORDER — LOSARTAN POTASSIUM AND HYDROCHLOROTHIAZIDE 12.5; 5 MG/1; MG/1
1 TABLET ORAL DAILY
Qty: 90 TAB | Refills: 3 | OUTPATIENT
Start: 2019-12-05

## 2019-12-05 NOTE — TELEPHONE ENCOUNTER
Requested Prescriptions     Pending Prescriptions Disp Refills    losartan-hydroCHLOROthiazide (HYZAAR) 50-12.5 mg per tablet 90 Tab 3     Sig: Take 1 Tab by mouth daily.        Received by fax

## 2019-12-10 ENCOUNTER — TELEPHONE (OUTPATIENT)
Dept: FAMILY MEDICINE CLINIC | Age: 54
End: 2019-12-10

## 2019-12-10 RX ORDER — LOSARTAN POTASSIUM 50 MG/1
50 TABLET ORAL DAILY
Qty: 90 TAB | Refills: 3 | Status: SHIPPED | OUTPATIENT
Start: 2019-12-10 | End: 2020-03-17 | Stop reason: SDUPTHER

## 2019-12-10 RX ORDER — HYDROCHLOROTHIAZIDE 12.5 MG/1
12.5 TABLET ORAL DAILY
Qty: 90 TAB | Refills: 3 | Status: SHIPPED | OUTPATIENT
Start: 2019-12-10 | End: 2020-03-17 | Stop reason: SDUPTHER

## 2019-12-10 NOTE — TELEPHONE ENCOUNTER
[facility] pharmacist called and stated that lorsartan-hctz is experiencing a nationwide shortage and she'd like to know what you'd like to do. Please advise.

## 2020-02-20 ENCOUNTER — TELEPHONE (OUTPATIENT)
Dept: FAMILY MEDICINE CLINIC | Age: 55
End: 2020-02-20

## 2020-02-20 RX ORDER — ATORVASTATIN CALCIUM 10 MG/1
10 TABLET, FILM COATED ORAL DAILY
Qty: 90 TAB | Refills: 2 | Status: SHIPPED | OUTPATIENT
Start: 2020-02-20 | End: 2021-03-11

## 2020-02-20 NOTE — TELEPHONE ENCOUNTER
Message from [facility]    Dr. Leija/ Telephone   Received: Today   Message Contents   [place], [address] Office Pool   Phone Number:  [phone] (Call me)             Patient is needing a refill on her cholesterol medicine (name unknown) to be sent to [facility] (on file). Please follow up with the patient in reference to this if needed.

## 2020-02-21 NOTE — TELEPHONE ENCOUNTER
Called pt, verified name and . Informed pt that per Dr. Siobhan Cardenas refill was completed and sent to pharmacy. Pt stated understanding.

## 2020-03-17 RX ORDER — LOSARTAN POTASSIUM 50 MG/1
50 TABLET ORAL DAILY
Qty: 90 TAB | Refills: 3 | Status: SHIPPED | OUTPATIENT
Start: 2020-03-17 | End: 2021-01-04 | Stop reason: SDUPTHER

## 2020-03-17 RX ORDER — HYDROCHLOROTHIAZIDE 12.5 MG/1
12.5 TABLET ORAL DAILY
Qty: 90 TAB | Refills: 3 | Status: SHIPPED | OUTPATIENT
Start: 2020-03-17 | End: 2021-03-17

## 2020-03-17 NOTE — TELEPHONE ENCOUNTER
Requested Prescriptions     Pending Prescriptions Disp Refills    hydroCHLOROthiazide (HYDRODIURIL) 12.5 mg tablet 90 Tab 3     Sig: Take 1 Tab by mouth daily.  losartan (COZAAR) 50 mg tablet 90 Tab 3     Sig: Take 1 Tab by mouth daily.

## 2020-07-13 ENCOUNTER — TELEPHONE (OUTPATIENT)
Dept: FAMILY MEDICINE CLINIC | Age: 55
End: 2020-07-13

## 2020-07-13 NOTE — TELEPHONE ENCOUNTER
Patient wants to come in the office to discuss medications. She thinks she is having a side effect with one of the medications. She is having vision problems that come and go, she not sure if is her cholesterol, blood pressure or sugar levels.     [phone]

## 2020-07-13 NOTE — TELEPHONE ENCOUNTER
Please offer pt virtual visit, if we are unable to solve problems virtually then we can consider in person appt

## 2020-07-17 RX ORDER — GLUCOSAMINE HCL 500 MG
50 TABLET ORAL
COMMUNITY

## 2020-07-20 ENCOUNTER — OFFICE VISIT (OUTPATIENT)
Dept: FAMILY MEDICINE CLINIC | Age: 55
End: 2020-07-20

## 2020-07-20 VITALS
OXYGEN SATURATION: 98 % | SYSTOLIC BLOOD PRESSURE: 111 MMHG | HEIGHT: 69 IN | WEIGHT: 174 LBS | DIASTOLIC BLOOD PRESSURE: 73 MMHG | HEART RATE: 76 BPM | BODY MASS INDEX: 25.77 KG/M2 | RESPIRATION RATE: 16 BRPM | TEMPERATURE: 98.2 F

## 2020-07-20 DIAGNOSIS — F43.0 ACUTE STRESS REACTION: ICD-10-CM

## 2020-07-20 DIAGNOSIS — I10 ESSENTIAL HYPERTENSION: Primary | ICD-10-CM

## 2020-07-20 DIAGNOSIS — E78.2 MIXED HYPERLIPIDEMIA: ICD-10-CM

## 2020-07-20 DIAGNOSIS — R73.9 ELEVATED BLOOD SUGAR: ICD-10-CM

## 2020-07-20 DIAGNOSIS — L81.9 CHANGE IN COLOR OF PIGMENTED SKIN LESION: ICD-10-CM

## 2020-07-20 DIAGNOSIS — E53.8 B12 DEFICIENCY: ICD-10-CM

## 2020-07-20 DIAGNOSIS — Z12.11 SCREEN FOR COLON CANCER: ICD-10-CM

## 2020-07-20 DIAGNOSIS — E55.9 VITAMIN D DEFICIENCY: ICD-10-CM

## 2020-07-20 DIAGNOSIS — Z72.0 TOBACCO ABUSE: ICD-10-CM

## 2020-07-20 RX ORDER — TRAZODONE HYDROCHLORIDE 50 MG/1
50 TABLET ORAL
Qty: 30 TAB | Refills: 2 | Status: SHIPPED | OUTPATIENT
Start: 2020-07-20 | End: 2020-08-11 | Stop reason: SDUPTHER

## 2020-07-20 NOTE — PROGRESS NOTES
Chief Complaint   Patient presents with    Medication Problem     1. Have you been to the ER, urgent care clinic since your last visit? Hospitalized since your last visit? No    2. Have you seen or consulted any other health care providers outside of the [address] [place] [address] since your last visit? Include any pap smears or colon screening.  No    Health Maintenance Due   Topic Date Due    FOBT Q1Y Age 54-65  09/27/2017

## 2020-07-20 NOTE — PROGRESS NOTES
Chief Complaint   Patient presents with    Medication Problem     Pt was seen in the office today. Pt reports that she is not sleeping well, has been ongoing for several months. Pt is concerned that her blood sugar may be elevated again due to blurred vision. Pt was able to stop metformin due to weight loss and healthy diet. Pt has been working from home for several months. Pt reports that she is eating less at home, but is smoking more. Pt reports increased stress wit her job. Subjective: (As above and below)     Chief Complaint   Patient presents with    Medication Problem     she is a 48y.o. year old female who presents for evaluation. Reviewed PmHx, RxHx, FmHx, SocHx, AllgHx and updated in chart. Review of Systems - negative except as listed above    Objective:     Vitals:    07/20/20 0956   BP: 111/73   Pulse: 76   Resp: 16   Temp: 98.2 °F (36.8 °C)   TempSrc: Temporal   SpO2: 98%   Weight: 174 lb (78.9 kg)   Height: 5' 9\" (1.753 m)     Physical Examination: General appearance - alert, well appearing, and in no distress  Mental status - normal mood, behavior, speech, dress, motor activity, and thought processes  Chest - clear to auscultation, no wheezes, rales or rhonchi, symmetric air entry  Heart - normal rate, regular rhythm, normal S1, S2, no murmurs, rubs, clicks or gallops  Musculoskeletal - no joint tenderness, deformity or swelling  Extremities - peripheral pulses normal, no pedal edema, no clubbing or cyanosis  Skin - significant sunburn, multiple pigmented nevi on extremities     Assessment/ Plan:   1. Essential hypertension  -well controlled  - CBC W/O DIFF  - TSH 3RD GENERATION    2. Mixed hyperlipidemia  - METABOLIC PANEL, COMPREHENSIVE  - LIPID PANEL    3. Elevated blood sugar  -check labs today  - HEMOGLOBIN A1C WITH EAG    4. Vitamin D deficiency  - VITAMIN D, 25 HYDROXY    5. Change in color of pigmented skin lesion  - REFERRAL TO DERMATOLOGY     6.  Tobacco Abuse  -discussed working on decreasing amount of smoking. 7. Acute stress reaction  -start on trazodone to help with sleep and anxiety     Follow up in 6 months or as needed  I have discussed the diagnosis with the patient and the intended plan as seen in the above orders. The patient has received an after-visit summary and questions were answered concerning future plans.      Medication Side Effects and Warnings were discussed with patient: yes  Patient Labs were reviewed: yes  Patient Past Records were reviewed:  yes    Anum Harding M.D.

## 2020-07-21 LAB
25(OH)D3+25(OH)D2 SERPL-MCNC: 50.2 NG/ML (ref 30–100)
ALBUMIN SERPL-MCNC: 4.5 G/DL (ref 3.8–4.9)
ALBUMIN/GLOB SERPL: 1.8 {RATIO} (ref 1.2–2.2)
ALP SERPL-CCNC: 91 IU/L (ref 39–117)
ALT SERPL-CCNC: 15 IU/L (ref 0–32)
AST SERPL-CCNC: 14 IU/L (ref 0–40)
BILIRUB SERPL-MCNC: 0.5 MG/DL (ref 0–1.2)
BUN SERPL-MCNC: 9 MG/DL (ref 6–24)
BUN/CREAT SERPL: 11 (ref 9–23)
CALCIUM SERPL-MCNC: 9.5 MG/DL (ref 8.7–10.2)
CHLORIDE SERPL-SCNC: 97 MMOL/L (ref 96–106)
CHOLEST SERPL-MCNC: 157 MG/DL (ref 100–199)
CO2 SERPL-SCNC: 26 MMOL/L (ref 20–29)
CREAT SERPL-MCNC: 0.85 MG/DL (ref 0.57–1)
ERYTHROCYTE [DISTWIDTH] IN BLOOD BY AUTOMATED COUNT: 13 % (ref 11.7–15.4)
EST. AVERAGE GLUCOSE BLD GHB EST-MCNC: 111 MG/DL
GLOBULIN SER CALC-MCNC: 2.5 G/DL (ref 1.5–4.5)
GLUCOSE SERPL-MCNC: 76 MG/DL (ref 65–99)
HBA1C MFR BLD: 5.5 % (ref 4.8–5.6)
HCT VFR BLD AUTO: 48 % (ref 34–46.6)
HDLC SERPL-MCNC: 37 MG/DL
HGB BLD-MCNC: 15.9 G/DL (ref 11.1–15.9)
INTERPRETATION, 910389: NORMAL
LDLC SERPL CALC-MCNC: 88 MG/DL (ref 0–99)
MCH RBC QN AUTO: 28.9 PG (ref 26.6–33)
MCHC RBC AUTO-ENTMCNC: 33.1 G/DL (ref 31.5–35.7)
MCV RBC AUTO: 87 FL (ref 79–97)
PLATELET # BLD AUTO: 187 X10E3/UL (ref 150–450)
POTASSIUM SERPL-SCNC: 3.9 MMOL/L (ref 3.5–5.2)
PROT SERPL-MCNC: 7 G/DL (ref 6–8.5)
RBC # BLD AUTO: 5.5 X10E6/UL (ref 3.77–5.28)
SODIUM SERPL-SCNC: 141 MMOL/L (ref 134–144)
TRIGL SERPL-MCNC: 159 MG/DL (ref 0–149)
TSH SERPL DL<=0.005 MIU/L-ACNC: 2 UIU/ML (ref 0.45–4.5)
VIT B12 SERPL-MCNC: 706 PG/ML (ref 232–1245)
VLDLC SERPL CALC-MCNC: 32 MG/DL (ref 5–40)
WBC # BLD AUTO: 7.9 X10E3/UL (ref 3.4–10.8)

## 2020-07-22 NOTE — PROGRESS NOTES
Cholesterol is greatly improved - keep up the good work! All other labs are within normal limits.    Please inform

## 2020-08-11 DIAGNOSIS — F43.0 ACUTE STRESS REACTION: ICD-10-CM

## 2020-08-11 RX ORDER — TRAZODONE HYDROCHLORIDE 50 MG/1
50 TABLET ORAL
Qty: 30 TAB | Refills: 2 | Status: SHIPPED | OUTPATIENT
Start: 2020-08-11 | End: 2020-08-15 | Stop reason: SDUPTHER

## 2020-08-15 DIAGNOSIS — F43.0 ACUTE STRESS REACTION: ICD-10-CM

## 2020-08-15 RX ORDER — TRAZODONE HYDROCHLORIDE 50 MG/1
50 TABLET ORAL
Qty: 90 TAB | Refills: 3 | Status: SHIPPED | OUTPATIENT
Start: 2020-08-15 | End: 2021-01-05 | Stop reason: ALTCHOICE

## 2021-01-04 RX ORDER — LOSARTAN POTASSIUM 50 MG/1
50 TABLET ORAL DAILY
Qty: 90 TAB | Refills: 3 | Status: SHIPPED | OUTPATIENT
Start: 2021-01-04 | End: 2021-01-05 | Stop reason: SDUPTHER

## 2021-01-05 ENCOUNTER — OFFICE VISIT (OUTPATIENT)
Dept: FAMILY MEDICINE CLINIC | Age: 56
End: 2021-01-05
Payer: COMMERCIAL

## 2021-01-05 VITALS
SYSTOLIC BLOOD PRESSURE: 130 MMHG | HEIGHT: 69 IN | TEMPERATURE: 97.1 F | HEART RATE: 76 BPM | DIASTOLIC BLOOD PRESSURE: 84 MMHG | WEIGHT: 172 LBS | RESPIRATION RATE: 17 BRPM | BODY MASS INDEX: 25.48 KG/M2 | OXYGEN SATURATION: 95 %

## 2021-01-05 DIAGNOSIS — H65.92 OME (OTITIS MEDIA WITH EFFUSION), LEFT: Primary | ICD-10-CM

## 2021-01-05 DIAGNOSIS — H93.13 TINNITUS OF BOTH EARS: ICD-10-CM

## 2021-01-05 DIAGNOSIS — I10 ESSENTIAL HYPERTENSION: ICD-10-CM

## 2021-01-05 PROCEDURE — 99213 OFFICE O/P EST LOW 20 MIN: CPT | Performed by: FAMILY MEDICINE

## 2021-01-05 RX ORDER — AZITHROMYCIN 250 MG/1
TABLET, FILM COATED ORAL
Qty: 6 TAB | Refills: 0 | Status: SHIPPED | OUTPATIENT
Start: 2021-01-05 | End: 2021-01-10

## 2021-01-05 RX ORDER — LOSARTAN POTASSIUM 50 MG/1
50 TABLET ORAL DAILY
Qty: 90 TAB | Refills: 3 | Status: SHIPPED | OUTPATIENT
Start: 2021-01-05 | End: 2021-09-09 | Stop reason: SDUPTHER

## 2021-01-05 NOTE — PROGRESS NOTES
1. Have you been to the ER, urgent care clinic since your last visit? Hospitalized since your last visit? No    2. Have you seen or consulted any other health care providers outside of the [address] [place] [address] since your last visit? Include any pap smears or colon screening. No    Health Maintenance Due   Topic Date Due    Shingrix Vaccine Age 49> (1 of 2) 09/27/2017    Flu Vaccine (1) 09/01/2020     Chief Complaint   Patient presents with    Ear Pain     Pt is having ringing in ears and feels off balance. Concerned w/ balance.

## 2021-01-05 NOTE — PROGRESS NOTES
Chief Complaint   Patient presents with    Ear Pain     Pt is having ringing in ears and feels off balance. Concerned w/ balance. Pt reports that she has had a ringing in her ears, getting worse. Pt also has some sinus pressure. Pt reports that she has a chronic problem with her ears since childhood. Pt has noted some balance issues for 2-3 days. Subjective: (As above and below)     Chief Complaint   Patient presents with    Ear Pain     Pt is having ringing in ears and feels off balance. Concerned w/ balance. she is a 50y.o. year old female who presents for evaluation. Reviewed PmHx, RxHx, FmHx, SocHx, AllgHx and updated in chart. Review of Systems - negative except as listed above    Objective:     Vitals:    01/05/21 1447   BP: 130/84   Pulse: 76   Resp: 17   Temp: 97.1 °F (36.2 °C)   TempSrc: Temporal   SpO2: 95%   Weight: 172 lb (78 kg)   Height: 5' 9\" (1.753 m)     Physical Examination: General appearance - alert, well appearing, and in no distress  Mental status - normal mood, behavior, speech, dress, motor activity, and thought processes  Ears - trace fluid right TM, left TM with erythema, cloudy   Chest - clear to auscultation, no wheezes, rales or rhonchi, symmetric air entry  Heart - normal rate, regular rhythm, normal S1, S2, no murmurs, rubs, clicks or gallops  Musculoskeletal - no joint tenderness, deformity or swelling    Assessment/ Plan:   1. OME (otitis media with effusion), left  -take medication as written  - azithromycin (ZITHROMAX) 250 mg tablet; Take 2 tablets today, then take 1 tablet daily  Dispense: 6 Tab; Refill: 0    2. Essential hypertension  -well controlled  - losartan (COZAAR) 50 mg tablet; Take 1 Tab by mouth daily. Dispense: 90 Tab; Refill: 3     3. Tinnitus  -refer to ENT    I have discussed the diagnosis with the patient and the intended plan as seen in the above orders.   The patient has received an after-visit summary and questions were answered concerning future plans.      Medication Side Effects and Warnings were discussed with patient: yes  Patient Labs were reviewed: yes  Patient Past Records were reviewed:  yes    Felisa Atkins M.D.

## 2021-03-11 RX ORDER — ATORVASTATIN CALCIUM 10 MG/1
TABLET, FILM COATED ORAL
Qty: 90 TAB | Refills: 2 | Status: SHIPPED | OUTPATIENT
Start: 2021-03-11 | End: 2021-09-09 | Stop reason: SDUPTHER

## 2021-03-17 RX ORDER — HYDROCHLOROTHIAZIDE 12.5 MG/1
TABLET ORAL
Qty: 90 TAB | Refills: 3 | Status: SHIPPED | OUTPATIENT
Start: 2021-03-17 | End: 2021-09-09 | Stop reason: SDUPTHER

## 2021-09-09 ENCOUNTER — OFFICE VISIT (OUTPATIENT)
Dept: FAMILY MEDICINE CLINIC | Age: 56
End: 2021-09-09
Payer: COMMERCIAL

## 2021-09-09 VITALS
OXYGEN SATURATION: 97 % | DIASTOLIC BLOOD PRESSURE: 84 MMHG | BODY MASS INDEX: 26.87 KG/M2 | HEART RATE: 82 BPM | RESPIRATION RATE: 16 BRPM | TEMPERATURE: 97.6 F | HEIGHT: 69 IN | WEIGHT: 181.4 LBS | SYSTOLIC BLOOD PRESSURE: 117 MMHG

## 2021-09-09 DIAGNOSIS — I10 ESSENTIAL HYPERTENSION: Primary | ICD-10-CM

## 2021-09-09 DIAGNOSIS — E55.9 VITAMIN D DEFICIENCY: ICD-10-CM

## 2021-09-09 DIAGNOSIS — E78.2 MIXED HYPERLIPIDEMIA: ICD-10-CM

## 2021-09-09 DIAGNOSIS — R73.03 PRE-DIABETES: ICD-10-CM

## 2021-09-09 DIAGNOSIS — E53.8 B12 DEFICIENCY: ICD-10-CM

## 2021-09-09 LAB
COMMENT, HOLDF: NORMAL
SAMPLES BEING HELD,HOLD: NORMAL

## 2021-09-09 PROCEDURE — 99214 OFFICE O/P EST MOD 30 MIN: CPT | Performed by: FAMILY MEDICINE

## 2021-09-09 RX ORDER — ATORVASTATIN CALCIUM 10 MG/1
10 TABLET, FILM COATED ORAL DAILY
Qty: 90 TABLET | Refills: 3 | Status: SHIPPED | OUTPATIENT
Start: 2021-09-09

## 2021-09-09 RX ORDER — HYDROCHLOROTHIAZIDE 12.5 MG/1
12.5 TABLET ORAL DAILY
Qty: 90 TABLET | Refills: 3 | Status: SHIPPED | OUTPATIENT
Start: 2021-09-09

## 2021-09-09 RX ORDER — LOSARTAN POTASSIUM 50 MG/1
50 TABLET ORAL DAILY
Qty: 90 TABLET | Refills: 3 | Status: SHIPPED | OUTPATIENT
Start: 2021-09-09 | End: 2022-08-03 | Stop reason: SDUPTHER

## 2021-09-09 NOTE — PROGRESS NOTES
1. Have you been to the ER, urgent care clinic since your last visit? Hospitalized since your last visit? Yes. [facility]    2. Have you seen or consulted any other health care providers outside of the [address] [place] [address] since your last visit? Include any pap smears or colon screening.  No    Health Maintenance Due   Topic Date Due    Hepatitis C Screening  Never done    Pneumococcal 0-64 years (1 of 2 - PPSV23) Never done    COVID-19 Vaccine (1) Never done    Shingrix Vaccine Age 50> (1 of 2) Never done    Cervical cancer screen  08/15/2019    A1C test (Diabetic or Prediabetic)  07/20/2021    Lipid Screen  07/20/2021    Flu Vaccine (1) Never done     Pap smear [id number]/706 Alina Conklin pt approved record request.

## 2021-09-09 NOTE — PROGRESS NOTES
Chief Complaint   Patient presents with    Hypertension     Pt has been taking her medication around 930-10. Pt reports that she has been doing well. Pt does not want the shingles vaccine. Pt does not get flu vaccines. Pt has not had a COVID vaccine. Pt reports that she has been exposed to COVID, has not developed symptoms. Pt does not plan to get the vaccine at this time. Subjective: (As above and below)     Chief Complaint   Patient presents with    Hypertension     she is a 50y.o. year old female who presents for evaluation. Reviewed PmHx, RxHx, FmHx, SocHx, AllgHx and updated in chart. Review of Systems - negative except as listed above    Objective:     Vitals:    09/09/21 0737   BP: 117/84   Pulse: 82   Resp: 16   Temp: 97.6 °F (36.4 °C)   TempSrc: Temporal   SpO2: 97%   Weight: 181 lb 6.4 oz (82.3 kg)   Height: 5' 9\" (1.753 m)     Physical Examination: General appearance - alert, well appearing, and in no distress  Mental status - normal mood, behavior, speech, dress, motor activity, and thought processes  Ears - bilateral TM's and external ear canals normal  Chest - clear to auscultation, no wheezes, rales or rhonchi, symmetric air entry  Heart - normal rate, regular rhythm, normal S1, S2, no murmurs, rubs, clicks or gallops  Musculoskeletal - no joint tenderness, deformity or swelling  Extremities - peripheral pulses normal, no pedal edema, no clubbing or cyanosis    Assessment/ Plan:   1. Essential hypertension  -well controlled  - CBC W/O DIFF; Future  - TSH 3RD GENERATION; Future  - HEPATITIS C AB; Future    2. Mixed hyperlipidemia  -check labs today, continue medication  - LIPID PANEL; Future    3. Pre-diabetes  -continue to work on diet and exercise  - METABOLIC PANEL, COMPREHENSIVE; Future  - HEMOGLOBIN A1C WITH EAG; Future    4. Vitamin D deficiency  - VITAMIN D, 25 HYDROXY; Future    5.  B12 deficiency  - VITAMIN B12; Future       I have discussed the diagnosis with the patient and the intended plan as seen in the above orders. The patient has received an after-visit summary and questions were answered concerning future plans.      Medication Side Effects and Warnings were discussed with patient: yes  Patient Labs were reviewed: yes  Patient Past Records were reviewed:  yes    Salo Azevedo M.D.

## 2021-09-10 LAB
25(OH)D3 SERPL-MCNC: 45.1 NG/ML (ref 30–100)
ALBUMIN SERPL-MCNC: 3.7 G/DL (ref 3.5–5)
ALBUMIN/GLOB SERPL: 1.1 {RATIO} (ref 1.1–2.2)
ALP SERPL-CCNC: 96 U/L (ref 45–117)
ALT SERPL-CCNC: 22 U/L (ref 12–78)
ANION GAP SERPL CALC-SCNC: 4 MMOL/L (ref 5–15)
AST SERPL-CCNC: 12 U/L (ref 15–37)
BILIRUB SERPL-MCNC: 0.5 MG/DL (ref 0.2–1)
BUN SERPL-MCNC: 9 MG/DL (ref 6–20)
BUN/CREAT SERPL: 13 (ref 12–20)
CALCIUM SERPL-MCNC: 9 MG/DL (ref 8.5–10.1)
CHLORIDE SERPL-SCNC: 105 MMOL/L (ref 97–108)
CHOLEST SERPL-MCNC: 159 MG/DL
CO2 SERPL-SCNC: 31 MMOL/L (ref 21–32)
CREAT SERPL-MCNC: 0.72 MG/DL (ref 0.55–1.02)
ERYTHROCYTE [DISTWIDTH] IN BLOOD BY AUTOMATED COUNT: 13.2 % (ref 11.5–14.5)
EST. AVERAGE GLUCOSE BLD GHB EST-MCNC: 114 MG/DL
GLOBULIN SER CALC-MCNC: 3.3 G/DL (ref 2–4)
GLUCOSE SERPL-MCNC: 84 MG/DL (ref 65–100)
HBA1C MFR BLD: 5.6 % (ref 4–5.6)
HCT VFR BLD AUTO: 48.4 % (ref 35–47)
HCV AB SERPL QL IA: NONREACTIVE
HDLC SERPL-MCNC: 37 MG/DL
HDLC SERPL: 4.3 {RATIO} (ref 0–5)
HGB BLD-MCNC: 15.2 G/DL (ref 11.5–16)
LDLC SERPL CALC-MCNC: 92.4 MG/DL (ref 0–100)
MCH RBC QN AUTO: 28.8 PG (ref 26–34)
MCHC RBC AUTO-ENTMCNC: 31.4 G/DL (ref 30–36.5)
MCV RBC AUTO: 91.7 FL (ref 80–99)
NRBC # BLD: 0 K/UL (ref 0–0.01)
NRBC BLD-RTO: 0 PER 100 WBC
PLATELET # BLD AUTO: 203 K/UL (ref 150–400)
POTASSIUM SERPL-SCNC: 4.2 MMOL/L (ref 3.5–5.1)
PROT SERPL-MCNC: 7 G/DL (ref 6.4–8.2)
RBC # BLD AUTO: 5.28 M/UL (ref 3.8–5.2)
SODIUM SERPL-SCNC: 140 MMOL/L (ref 136–145)
TRIGL SERPL-MCNC: 148 MG/DL (ref ?–150)
TSH SERPL DL<=0.05 MIU/L-ACNC: 1.86 UIU/ML (ref 0.36–3.74)
VIT B12 SERPL-MCNC: 464 PG/ML (ref 193–986)
VLDLC SERPL CALC-MCNC: 29.6 MG/DL
WBC # BLD AUTO: 9.3 K/UL (ref 3.6–11)

## 2022-03-07 ENCOUNTER — TELEPHONE (OUTPATIENT)
Dept: FAMILY MEDICINE CLINIC | Age: 57
End: 2022-03-07

## 2022-03-07 RX ORDER — VALSARTAN 160 MG/1
160 TABLET ORAL DAILY
Qty: 90 TABLET | Refills: 1 | Status: SHIPPED | OUTPATIENT
Start: 2022-03-07 | End: 2022-08-03

## 2022-03-07 NOTE — TELEPHONE ENCOUNTER
Pt is calling stating med is on back order and she is wondering if you can contact [facility] and give them an alternative because they don't know when they will get med in. She will be out of med Wednesday. Needs something done by then.     Losartan is on back order

## 2022-07-27 ENCOUNTER — TELEPHONE (OUTPATIENT)
Dept: FAMILY MEDICINE CLINIC | Age: 57
End: 2022-07-27

## 2022-07-27 NOTE — TELEPHONE ENCOUNTER
Pt is having bad dizzy spells, bp is all over the place. Pt has not changed meds. She wants to know if she should go to Urgent Care instead of waiting for her apt next week.

## 2022-07-27 NOTE — TELEPHONE ENCOUNTER
Pt states yesterday she had a bad dizzy spell where she got nausea and vomit and after that she had a really bad headache. Today is not feeling quite herself and she still having some pain on her right side of her head and feeling weird.  Per Dr. Emely Dietz pt was told to go to urgent care

## 2022-08-03 ENCOUNTER — OFFICE VISIT (OUTPATIENT)
Dept: FAMILY MEDICINE CLINIC | Age: 57
End: 2022-08-03
Payer: COMMERCIAL

## 2022-08-03 VITALS
TEMPERATURE: 98 F | DIASTOLIC BLOOD PRESSURE: 78 MMHG | WEIGHT: 176 LBS | SYSTOLIC BLOOD PRESSURE: 105 MMHG | HEART RATE: 76 BPM | BODY MASS INDEX: 25.99 KG/M2

## 2022-08-03 DIAGNOSIS — E78.2 MIXED HYPERLIPIDEMIA: ICD-10-CM

## 2022-08-03 DIAGNOSIS — E53.8 B12 DEFICIENCY: ICD-10-CM

## 2022-08-03 DIAGNOSIS — I10 ESSENTIAL HYPERTENSION: ICD-10-CM

## 2022-08-03 DIAGNOSIS — E55.9 VITAMIN D DEFICIENCY: ICD-10-CM

## 2022-08-03 DIAGNOSIS — R73.03 PRE-DIABETES: Primary | ICD-10-CM

## 2022-08-03 DIAGNOSIS — R42 VERTIGO: ICD-10-CM

## 2022-08-03 PROCEDURE — 99214 OFFICE O/P EST MOD 30 MIN: CPT | Performed by: FAMILY MEDICINE

## 2022-08-03 RX ORDER — LOSARTAN POTASSIUM 25 MG/1
25 TABLET ORAL DAILY
Qty: 90 TABLET | Refills: 1 | Status: SHIPPED | OUTPATIENT
Start: 2022-08-03

## 2022-08-03 NOTE — PROGRESS NOTES
Chief Complaint   Patient presents with    Dizziness     Comes in spells, pt reports being \"fine last Tuesday. ..felt like the whole building was spinning\" and reports vertigo type feeling for 15+ minutes, then accompanied by nausea and vomiting. Follow-up     Urgent care visit and there was found at the appointment while presenting for dizziness, water in the ears, BP associated with migraines. Pt reports that last Tuesday night she developed sudden vertigo, room spinning , vomiting. Pt was seen at urgent care, was diagnosed with fluid in her ears. Pt reports that before vertigo started she had been working from home. No exercise, no outdoor activities, no clear cause, was peeling potatoes. Hit out the blue. Pt has been tracking BP, ranging from 96//82. S;lightly higher during vertigo. Subjective: (As above and below)     Chief Complaint   Patient presents with    Dizziness     Comes in spells, pt reports being \"fine last Tuesday. ..felt like the whole building was spinning\" and reports vertigo type feeling for 15+ minutes, then accompanied by nausea and vomiting. Follow-up     Urgent care visit and there was found at the appointment while presenting for dizziness, water in the ears, BP associated with migraines. she is a 49y.o. year old female who presents for evaluation. Reviewed PmHx, RxHx, FmHx, SocHx, AllgHx and updated in chart.     Review of Systems - negative except as listed above    Objective:     Vitals:    08/03/22 1358   BP: 105/78   Pulse: 76   Resp: (P) 19   Temp: 98 °F (36.7 °C)   TempSrc: Oral   SpO2: (P) 98%   Weight: 176 lb (79.8 kg)   Height: (P) 5' 9\" (1.753 m)     Physical Examination: General appearance - alert, well appearing, and in no distress  Mental status - normal mood, behavior, speech, dress, motor activity, and thought processes  Ears - bilateral TM's and external ear canals normal  Chest - clear to auscultation, no wheezes, rales or rhonchi, symmetric air entry  Heart - normal rate, regular rhythm, normal S1, S2, no murmurs, rubs, clicks or gallops  Musculoskeletal - no joint tenderness, deformity or swelling  Extremities - peripheral pulses normal, no pedal edema, no clubbing or cyanosis    Assessment/ Plan:   1. Essential hypertension  -drop losartan to 25mg  -continue HCTZ  - losartan (COZAAR) 25 mg tablet; Take 1 Tablet by mouth in the morning. Dispense: 90 Tablet; Refill: 1  - CBC W/O DIFF; Future  - TSH 3RD GENERATION; Future    2. Pre-diabetes  - METABOLIC PANEL, COMPREHENSIVE; Future  - HEMOGLOBIN A1C WITH EAG; Future    3. Mixed hyperlipidemia  - LIPID PANEL; Future    4. Vitamin D deficiency  - VITAMIN D, 25 HYDROXY; Future    5. Vertigo  -resolved at this time    6. B12 deficiency  - VITAMIN B12; Future       I have discussed the diagnosis with the patient and the intended plan as seen in the above orders. The patient has received an after-visit summary and questions were answered concerning future plans.      Medication Side Effects and Warnings were discussed with patient: yes  Patient Labs were reviewed: yes  Patient Past Records were reviewed:  yes    Alejandro Winkler M.D.

## 2022-08-03 NOTE — PROGRESS NOTES
Identified pt with two pt identifiers(name and ). Reviewed record in preparation for visit and have obtained necessary documentation. Chief Complaint   Patient presents with    Dizziness     Comes in spells, pt reports being \"fine last Tuesday. ..felt like the whole building was spinning\" and reports vertigo type feeling for 15+ minutes, then accompanied by nausea and vomiting. Vitals:    22 1358   BP: 105/78   Pulse: 76   Resp: (P) 19   Temp: 98 °F (36.7 °C)   TempSrc: Oral   SpO2: (P) 98%   Weight: 176 lb (79.8 kg)   Height: (P) 5' 9\" (1.753 m)   PainSc: (P)   0 - No pain       Health Maintenance Due   Topic    COVID-19 Vaccine (1)    Pneumococcal 0-64 years (1 - PCV)    Breast Cancer Screen Mammogram        1. \"Have you been to the ER, urgent care clinic since your last visit? Hospitalized since your last visit? \" [facility] for [facility] spells     2. \"Have you seen or consulted any other health care providers outside of the [address] [place] [address] since your last visit? \" [facility]      3. For patients over 45: Has the patient had a colonoscopy? Yes - no Care Gap present     If the patient is female:    4. For patients over 40: Has the patient had a mammogram? Yes - Care Gap present. Most recent result on file    5. For patients over 21: Has the patient had a pap smear?  Yes - no Care Gap present    Current Outpatient Medications   Medication Instructions    atorvastatin (LIPITOR) 10 mg, Oral, DAILY    Cholecalciferol (Vitamin D3) 50 mcg, Oral    hydroCHLOROthiazide (HYDRODIURIL) 12.5 mg, Oral, DAILY    losartan (COZAAR) 50 mg, Oral, DAILY    OTHER Amberen (Menopause relieft) OTC    valsartan (DIOVAN) 160 mg, Oral, DAILY       Allergies   Allergen Reactions    Codeine Hives    Cranberry Fruit Hives    Hydrocodone-Acetaminophen Itching    Keflex [Cephalexin] Unknown (comments)    Morphine Hives    Pcn [Penicillins] Shortness of Breath    Sulfa (Sulfonamide Antibiotics) Hives Immunization History   Administered Date(s) Administered    Tdap 01/01/2014       Past Medical History:   Diagnosis Date    Essential hypertension     Pre-diabetes

## 2022-08-04 LAB
25(OH)D3 SERPL-MCNC: 64.2 NG/ML (ref 30–100)
ALBUMIN SERPL-MCNC: 4 G/DL (ref 3.5–5)
ALBUMIN/GLOB SERPL: 1.3 {RATIO} (ref 1.1–2.2)
ALP SERPL-CCNC: 96 U/L (ref 45–117)
ALT SERPL-CCNC: 22 U/L (ref 12–78)
ANION GAP SERPL CALC-SCNC: 4 MMOL/L (ref 5–15)
AST SERPL-CCNC: 14 U/L (ref 15–37)
BILIRUB SERPL-MCNC: 0.5 MG/DL (ref 0.2–1)
BUN SERPL-MCNC: 12 MG/DL (ref 6–20)
BUN/CREAT SERPL: 17 (ref 12–20)
CALCIUM SERPL-MCNC: 9.7 MG/DL (ref 8.5–10.1)
CHLORIDE SERPL-SCNC: 100 MMOL/L (ref 97–108)
CHOLEST SERPL-MCNC: 154 MG/DL
CO2 SERPL-SCNC: 33 MMOL/L (ref 21–32)
COMMENT, HOLDF: NORMAL
CREAT SERPL-MCNC: 0.72 MG/DL (ref 0.55–1.02)
ERYTHROCYTE [DISTWIDTH] IN BLOOD BY AUTOMATED COUNT: 13.4 % (ref 11.5–14.5)
EST. AVERAGE GLUCOSE BLD GHB EST-MCNC: 117 MG/DL
GLOBULIN SER CALC-MCNC: 3.2 G/DL (ref 2–4)
GLUCOSE SERPL-MCNC: 90 MG/DL (ref 65–100)
HBA1C MFR BLD: 5.7 % (ref 4–5.6)
HCT VFR BLD AUTO: 47.7 % (ref 35–47)
HDLC SERPL-MCNC: 38 MG/DL
HDLC SERPL: 4.1 {RATIO} (ref 0–5)
HGB BLD-MCNC: 15.7 G/DL (ref 11.5–16)
LDLC SERPL CALC-MCNC: 75.4 MG/DL (ref 0–100)
MCH RBC QN AUTO: 29 PG (ref 26–34)
MCHC RBC AUTO-ENTMCNC: 32.9 G/DL (ref 30–36.5)
MCV RBC AUTO: 88.2 FL (ref 80–99)
NRBC # BLD: 0 K/UL (ref 0–0.01)
NRBC BLD-RTO: 0 PER 100 WBC
PLATELET # BLD AUTO: 210 K/UL (ref 150–400)
PMV BLD AUTO: 13 FL (ref 8.9–12.9)
POTASSIUM SERPL-SCNC: 3.5 MMOL/L (ref 3.5–5.1)
PROT SERPL-MCNC: 7.2 G/DL (ref 6.4–8.2)
RBC # BLD AUTO: 5.41 M/UL (ref 3.8–5.2)
SAMPLES BEING HELD,HOLD: NORMAL
SODIUM SERPL-SCNC: 137 MMOL/L (ref 136–145)
TRIGL SERPL-MCNC: 203 MG/DL (ref ?–150)
TSH SERPL DL<=0.05 MIU/L-ACNC: 1.89 UIU/ML (ref 0.36–3.74)
VIT B12 SERPL-MCNC: >2000 PG/ML (ref 193–986)
VLDLC SERPL CALC-MCNC: 40.6 MG/DL
WBC # BLD AUTO: 10.5 K/UL (ref 3.6–11)

## 2022-08-04 NOTE — PROGRESS NOTES
Vitamin B12 is significantly elevated, please reduce supplement. Increase in risk for diabetes, please closely monitor diet and increase exercise   Your cholesterol came back looking pretty good. Your triglycerides are slightly elevated. The best way to bring that number down is to incorporate more omega 3's into your diet. Here are some suggestions on how to do that:  - eat 2-3 serving of fish like salmon and trout per week  - eat lots of fresh dark leafy green vegetables  - incorporate more garlic into your diet  Recheck in 6 months.    Please inform

## 2022-08-05 NOTE — PROGRESS NOTES
verified. Informed patient of message from provider/lab results. Patient verified understanding. Patients lab results mailed.

## 2022-08-15 ENCOUNTER — HOSPITAL ENCOUNTER (EMERGENCY)
Age: 57
Discharge: HOME | End: 2022-08-15
Attending: EMERGENCY MEDICINE
Payer: COMMERCIAL

## 2022-08-15 VITALS
OXYGEN SATURATION: 98 % | HEIGHT: 69 IN | DIASTOLIC BLOOD PRESSURE: 82 MMHG | TEMPERATURE: 98.2 F | BODY MASS INDEX: 25.76 KG/M2 | WEIGHT: 173.94 LBS | HEART RATE: 83 BPM | SYSTOLIC BLOOD PRESSURE: 130 MMHG | RESPIRATION RATE: 14 BRPM

## 2022-08-15 DIAGNOSIS — U07.1 COVID-19: Primary | ICD-10-CM

## 2022-08-15 DIAGNOSIS — H92.01 RIGHT EAR PAIN: ICD-10-CM

## 2022-08-15 LAB
COVID-19 RAPID TEST, COVR: NOT DETECTED
SOURCE, COVRS: DETECTED

## 2022-08-15 PROCEDURE — 74011250637 HC RX REV CODE- 250/637: Performed by: PHYSICIAN ASSISTANT

## 2022-08-15 PROCEDURE — 87635 SARS-COV-2 COVID-19 AMP PRB: CPT

## 2022-08-15 PROCEDURE — 74011636637 HC RX REV CODE- 636/637: Performed by: PHYSICIAN ASSISTANT

## 2022-08-15 PROCEDURE — 99283 EMERGENCY DEPT VISIT LOW MDM: CPT

## 2022-08-15 RX ORDER — AZITHROMYCIN 250 MG/1
TABLET, FILM COATED ORAL
Qty: 6 TABLET | Refills: 0 | Status: SHIPPED | OUTPATIENT
Start: 2022-08-15

## 2022-08-15 RX ORDER — IBUPROFEN 400 MG/1
400 TABLET ORAL
Status: COMPLETED | OUTPATIENT
Start: 2022-08-15 | End: 2022-08-15

## 2022-08-15 RX ORDER — ONDANSETRON 4 MG/1
4 TABLET, FILM COATED ORAL
Qty: 20 TABLET | Refills: 0 | Status: SHIPPED | OUTPATIENT
Start: 2022-08-15

## 2022-08-15 RX ORDER — PREDNISONE 20 MG/1
60 TABLET ORAL
Status: COMPLETED | OUTPATIENT
Start: 2022-08-15 | End: 2022-08-15

## 2022-08-15 RX ORDER — PREDNISONE 20 MG/1
40 TABLET ORAL DAILY
Qty: 8 TABLET | Refills: 0 | Status: SHIPPED | OUTPATIENT
Start: 2022-08-15 | End: 2022-08-19

## 2022-08-15 RX ADMIN — PREDNISONE 60 MG: 20 TABLET ORAL at 16:53

## 2022-08-15 RX ADMIN — IBUPROFEN 400 MG: 400 TABLET, FILM COATED ORAL at 16:53

## 2022-08-15 NOTE — ED PROVIDER NOTES
EMERGENCY DEPARTMENT HISTORY AND PHYSICAL EXAM      Date: 8/15/2022  Patient Name: Luca Negrete    History of Presenting Illness     Chief Complaint   Patient presents with    Ear Pain     Right ear and below since last night; recently treated for UTI and has once dose left for that. History Provided By: Patient    HPI: Luca Negrete, 49 y.o. female with significant PMHx for HTN, presents to the ED with cc of right ear pain, cough, and congestion. Patient states 3 weeks ago she had an \"attack of vertigo and had fluid in both ears. \" She then went to   [facility] Wednesday 8/10, and told fluid she had a UTI and placed on Macrobid. States just following that visit she started having diffuse generalized body aches and intermittent fevers for a few days, but those have since improved and denies any fevers since Friday 8/12. States she started to feel a little better yesterday but then last night experienced intermittent popping in her right ear. Endorses mild to moderate, intermittent pain, described as \"pin that goes (from my right ear) straight down my throat. \" States ear pain is worse with coughing. Has been taking Tylenol cold, muccinex and flonase with minimal improvement. Denies difficulty swallowing, tolerating solids and liquids well. Denies any known sick contacts. Denies neck pain or stiffness, changes in hearing, tinnitus, sore throat, dental pain, facial swelling or pain, chest pain, SOB, wheezing, abdominal pain, changes in bowel or bladder habits, rashes or LOC. There are no other complaints, changes, or physical findings at this time. PCP: Carlos Molina MD    No current facility-administered medications on file prior to encounter. Current Outpatient Medications on File Prior to Encounter   Medication Sig Dispense Refill    losartan (COZAAR) 25 mg tablet Take 1 Tablet by mouth in the morning. 90 Tablet 1    atorvastatin (LIPITOR) 10 mg tablet Take 1 Tablet by mouth daily.  80 Tablet 3    hydroCHLOROthiazide (HYDRODIURIL) 12.5 mg tablet Take 1 Tablet by mouth daily. 90 Tablet 3    Cholecalciferol, Vitamin D3, 75 mcg (3,000 unit) tab Take 50 mcg by mouth. Past History     Past Medical History:  Past Medical History:   Diagnosis Date    Essential hypertension     Pre-diabetes        Past Surgical History:  Past Surgical History:   Procedure Laterality Date    HX  SECTION      HX CHOLECYSTECTOMY      HX GYN      ovarian cyst    HX HEENT      tonsillectomy and adenoidectomy    HX ORTHOPAEDIC  2001    foot sx    HX TUBAL LIGATION         Family History:  Family History   Problem Relation Age of Onset    Heart Disease Mother     Arthritis-rheumatoid Mother     Kidney Disease Father        Social History:  Social History     Tobacco Use    Smoking status: Every Day     Packs/day: 1.00     Types: Cigarettes     Last attempt to quit: 2018     Years since quittin.5    Smokeless tobacco: Never   Vaping Use    Vaping Use: Never used   Substance Use Topics    Alcohol use: No    Drug use: Never       Allergies: Allergies   Allergen Reactions    Codeine Hives    Cranberry Fruit Hives    Hydrocodone-Acetaminophen Itching    Keflex [Cephalexin] Unknown (comments)    Morphine Hives    Pcn [Penicillins] Shortness of Breath    Sulfa (Sulfonamide Antibiotics) Hives         Review of Systems   Review of Systems   Constitutional:  Positive for chills and fever. Negative for appetite change. HENT:  Positive for congestion, ear discharge, ear pain, rhinorrhea, sinus pressure and sneezing. Eyes:  Negative for pain. Respiratory:  Positive for cough. Negative for shortness of breath. Cardiovascular:  Negative for chest pain and leg swelling. Gastrointestinal:  Negative for abdominal pain, constipation, diarrhea, nausea and vomiting. Genitourinary:  Negative for difficulty urinating, dysuria and frequency. Musculoskeletal:  Positive for myalgias.  Negative for neck stiffness. Skin:  Negative for rash. Neurological:  Negative for syncope and headaches. All other systems reviewed and are negative. Physical Exam   Physical Exam  Vitals and nursing note reviewed. Constitutional:       General: She is not in acute distress. Appearance: Normal appearance. She is not ill-appearing, toxic-appearing or diaphoretic. Comments: 49 y.o. female   HENT:      Head: Normocephalic and atraumatic. Right Ear: Hearing, ear canal and external ear normal. No drainage. No mastoid tenderness. No hemotympanum. Tympanic membrane is erythematous. Tympanic membrane is not perforated. Left Ear: Hearing, ear canal and external ear normal. No drainage. No mastoid tenderness. No hemotympanum. Tympanic membrane is not perforated. Ears:      Comments: Right TM with mild erythema and bulging, no perforation. Canal normal.  No drainage or bleeding. No mastoid tenderness. No pain with manipulation of the tragus. Nose: Congestion and rhinorrhea present. Comments: Nares erythematous and boggy with moderate congestion. Mouth/Throat:      Mouth: Mucous membranes are moist.      Pharynx: Oropharynx is clear. No pharyngeal swelling or posterior oropharyngeal erythema. Tonsils: No tonsillar exudate or tonsillar abscesses. Eyes:      Extraocular Movements: Extraocular movements intact. Conjunctiva/sclera: Conjunctivae normal.   Neck:      Trachea: Trachea and phonation normal.      Comments: No meningismus. Good ROM  Cardiovascular:      Rate and Rhythm: Normal rate and regular rhythm. Pulses: Normal pulses. Heart sounds: Normal heart sounds. No murmur heard. No friction rub. No gallop. Pulmonary:      Effort: Pulmonary effort is normal. No respiratory distress. Breath sounds: Normal breath sounds. No wheezing. Abdominal:      General: There is no distension. Palpations: Abdomen is soft. There is no mass. Tenderness:  There is no abdominal tenderness. There is no guarding. Musculoskeletal:         General: Normal range of motion. Cervical back: Normal range of motion. Lymphadenopathy:      Cervical: Cervical adenopathy present. Right cervical: Superficial cervical adenopathy present. Left cervical: Superficial cervical adenopathy present. Skin:     General: Skin is warm and dry. Neurological:      General: No focal deficit present. Mental Status: She is alert and oriented to person, place, and time. Psychiatric:         Mood and Affect: Mood normal.         Behavior: Behavior normal.       Diagnostic Study Results     Labs -   No results found for this or any previous visit (from the past 12 hour(s)). Radiologic Studies -   No orders to display     CT Results  (Last 48 hours)      None          CXR Results  (Last 48 hours)      None              Medical Decision Making   I am the first provider for this patient. I reviewed the vital signs, available nursing notes, past medical history, past surgical history, family history and social history. Vital Signs-Reviewed the patient's vital signs. No data found. Records Reviewed: Nursing Notes and Old Medical Records    Provider Notes (Medical Decision Making):   Patient presents ED for evaluation as noted above. Afebrile, nontoxic in. No hypoxia increased work of breathing, breath sounds clear throughout. No evidence of perforation, mastoid tenderness, malignant otitis externa, or other emergent conditions going further evaluation/management acutely here at this time. Patient also found to be COVID-19 positive.   Discussed the risk/benefits/alternatives of antibiotic use and viral etiology, although duration of symptoms will place on Azithromycin (shared decision making performed and care plan created together; patient elects this management plan, and states she has done well with azithromycin for this in the past [long list of allergies]. Shared decision-making form and care plan created together, discussed diagnosis and treatment plan. Counseled symptomatic management techniques. No evidence of emergent conditions requiring further evaluation/management acutely here at this time. PCP follow-up. Verbal return precautions advised. Patient verbalizes understanding and agreement of current plan of care. ED Course:   Initial assessment performed. The patients presenting problems have been discussed, and they are in agreement with the care plan formulated and outlined with them. I have encouraged them to ask questions as they arise throughout their visit. Critical Care Time: None    Disposition:  Discharge     PLAN:  1. Discharge Medication List as of 8/15/2022  5:33 PM        START taking these medications    Details   azithromycin (Zithromax Z-Clarke) 250 mg tablet Take two tabs now then one tab daily for the next four days, Normal, Disp-6 Tablet, R-0      predniSONE (DELTASONE) 20 mg tablet Take 2 Tablets by mouth in the morning for 4 days. With Breakfast, Normal, Disp-8 Tablet, R-0      ondansetron hcl (Zofran) 4 mg tablet Take 1 Tablet by mouth every eight (8) hours as needed for Nausea or Vomiting., Normal, Disp-20 Tablet, R-0           CONTINUE these medications which have NOT CHANGED    Details   losartan (COZAAR) 25 mg tablet Take 1 Tablet by mouth in the morning., Normal, Disp-90 Tablet, R-1      atorvastatin (LIPITOR) 10 mg tablet Take 1 Tablet by mouth daily. , Normal, Disp-90 Tablet, R-3      hydroCHLOROthiazide (HYDRODIURIL) 12.5 mg tablet Take 1 Tablet by mouth daily. , Normal, Disp-90 Tablet, R-3      Cholecalciferol, Vitamin D3, 75 mcg (3,000 unit) tab Take 50 mcg by mouth., Historical Med           2.    Follow-up Information       Follow up With Specialties Details Why Contact Info    [facility] EMERGENCY DEPT Emergency Medicine  As needed, If symptoms worsen [address]   [address] [place] [address]    Carlos Molina MD Family Medicine In 1 week  [address]  [address] [address]  [facility]    [address] [address] [phone]          Return to ED if worse     Diagnosis     Clinical Impression:   1. COVID-19    2. Right ear pain          Please note that this dictation was completed with [employer], the computer voice recognition software. Quite often unanticipated grammatical, syntax, homophones, and other interpretive errors are inadvertently transcribed by the computer software. Please disregards these errors. Please excuse any errors that have escaped final proofreading.

## 2022-08-15 NOTE — Clinical Note
[address]  [facility] EMERGENCY DEPT  [address] [facility]  [place] [address]  [phone]    Work/School Note    Date: 8/15/2022     To Whom It May concern:    Corona Marquez was evaluated by the following provider(s):  Attending Provider: Michael Rainey MD  Physician Assistant: Devin Fabian, [address] [place] [address] virus is suspected. Per the CDC guidelines we recommend home isolation until the following conditions are all met:    1. At least five days have passed since symptoms first appeared and/or had a close exposure,   2. After home isolation for five days, wearing a mask around others for the next five days,  3. At least 24 have passed since last fever without the use of fever-reducing medications and  4.  Symptoms (eg cough, shortness of breath) have improved      Sincerely,          RUKHSANA Ugarte

## 2022-11-21 DIAGNOSIS — E78.2 MIXED HYPERLIPIDEMIA: ICD-10-CM

## 2022-11-21 DIAGNOSIS — I10 ESSENTIAL HYPERTENSION: ICD-10-CM

## 2022-11-21 RX ORDER — ATORVASTATIN CALCIUM 10 MG/1
TABLET, FILM COATED ORAL
Qty: 90 TABLET | Refills: 3 | Status: SHIPPED | OUTPATIENT
Start: 2022-11-21

## 2022-11-21 RX ORDER — HYDROCHLOROTHIAZIDE 12.5 MG/1
TABLET ORAL
Qty: 90 TABLET | Refills: 3 | Status: SHIPPED | OUTPATIENT
Start: 2022-11-21

## 2023-02-02 DIAGNOSIS — I10 ESSENTIAL HYPERTENSION: ICD-10-CM

## 2023-02-02 RX ORDER — LOSARTAN POTASSIUM 25 MG/1
TABLET ORAL
Qty: 90 TABLET | Refills: 1 | Status: SHIPPED | OUTPATIENT
Start: 2023-02-02

## 2023-02-23 NOTE — PROGRESS NOTES
Tabby Reed is a 56 y.o. female  and presents with   Chief Complaint   Patient presents with    Ear Fullness     Pt states she believes she has some fluid in her ears, sometimes causes her to feel off balance. Medication Evaluation     She feels like she can hear \"wind\" in her ears and then she will feel slightly off balance for the last week The symptoms come and go. Has tried flonase and OTC swimmer ear drops without relief. She states she can put a ear bud in her ear and the feeling will stop. Denies any fever, chills, headache, LOC, vision changes, fatigue, numbness/tingling, cough, sore throat, sinus/nasal congestion, chest pain, SOB, N/V/D. Hypertension and HLD Review:  The patient has essential hypertension   Current medication: Losartan 25 mg daily, HCTZ 12.5 mg daily and atorvastatin 10 mg daily   Compliance? Yes   Diet: Low carb diet and low salt diet. Coffee, rarely water. 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 Home BP Monitoring: Yes,  Pertinent ROS: no TIA's, no chest pain on exertion, no dyspnea on exertion, no swelling of ankles. Denies cardiac complaints including chest pain or discomfort, elevated heart rate, or palpitations. Denies respiratory complaints including SOB, difficulty or pain with breathing, wheezes, and cough. Denies fever, myalgias, chills, weakness, fatigue and other systemic symptoms. No N/V/D  ROS is otherwise negative. Current Outpatient Medications on File Prior to Visit   Medication Sig Dispense Refill    multivit-min-iron-FA-lutein (Centrum Silver Women) 8 mg iron-400 mcg-300 mcg tab Take  by mouth. beta-carotene,A,-vits C,E/mins (VISION PO) Take  by mouth.  OTC eye drops made by [employer] made      losartan (COZAAR) 25 mg tablet TAKE 1 TABLET BY MOUTH EVERY DAY IN THE MORNING 90 Tablet 1    atorvastatin (LIPITOR) 10 mg tablet TAKE 1 TABLET BY MOUTH EVERY DAY 90 Tablet 3    hydroCHLOROthiazide (HYDRODIURIL) 12.5 mg tablet TAKE 1 TABLET BY MOUTH EVERY DAY 90 Tablet 3    ondansetron hcl (Zofran) 4 mg tablet Take 1 Tablet by mouth every eight (8) hours as needed for Nausea or Vomiting. 20 Tablet 0    azithromycin (Zithromax Z-Clarke) 250 mg tablet Take two tabs now then one tab daily for the next four days (Patient not taking: Reported on 2023) 6 Tablet 0    Cholecalciferol, Vitamin D3, 75 mcg (3,000 unit) tab Take 50 mcg by mouth. (Patient not taking: Reported on 2023)       No current facility-administered medications on file prior to visit.       Past Medical History:   Diagnosis Date    Essential hypertension     Pre-diabetes      Past Surgical History:   Procedure Laterality Date    HX  SECTION      HX CHOLECYSTECTOMY      HX GYN  1988    ovarian cyst    HX HEENT      tonsillectomy and adenoidectomy    HX ORTHOPAEDIC  2001    foot sx    HX TUBAL LIGATION       Social History     Socioeconomic History    Marital status:      Spouse name: Not on file    Number of children: Not on file    Years of education: Not on file    Highest education level: Not on file   Occupational History    Occupation: [email or web address]   Tobacco Use    Smoking status: Every Day     Packs/day: 1.00     Types: Cigarettes     Last attempt to quit: 2018     Years since quittin.1    Smokeless tobacco: Never   Vaping Use    Vaping Use: Never used   Substance and Sexual Activity    Alcohol use: No    Drug use: Never    Sexual activity: Not on file   Other Topics Concern    Not on file   Social History Narrative    Not on file     Social Determinants of Health     Financial Resource Strain: Not on file   Food Insecurity: Not on file   Transportation Needs: Not on file   Physical Activity: Not on file   Stress: Not on file   Social Connections: Not on file   Intimate Partner Violence: Not on file   Housing Stability: Not on file     Allergies   Allergen Reactions    Codeine Hives    Cranberry Fruit Hives    Hydrocodone-Acetaminophen Itching    Keflex [Cephalexin] Unknown (comments)    Morphine Hives    Pcn [Penicillins] Shortness of Breath    Sulfa (Sulfonamide Antibiotics) Hives       Review of Systems  A comprehensive review of systems was negative except for that written in the HPI. Agree with nurses note. OBJECTIVE:     Visit Vitals  /77 (BP 1 Location: Left upper arm, BP Patient Position: Sitting, BP Cuff Size: Adult)   Pulse 82   Temp 98.6 °F (37 °C) (Temporal)   Resp 17   Ht 5' 8.5\" (1.74 m)   Wt 178 lb (80.7 kg)   SpO2 95%   BMI 26.67 kg/m²        She appears well, vital signs are as noted above   PAIN: No complaints of pain today. HEAD:  Normocephalic. Atraumatic. Non tender sinuses x 4. EYE: PERRLA. EOMs intact. Sclera anicteric without injection. No drainage or discharge. EARS: Hearing intact bilaterally. External ear canals normal without evidence of blood or swelling. Bilateral TM's intact, pearly grey with landmarks visible. No erythema. Effusion in right TM > left TM. TM do not look infected without any bulging or erythema. NOSE: Patent. Nasal turbinates pink. No polyps noted. No erythema. No discharge. MOUTH: mucous membranes pink and moist. Posterior pharynx normal with cobblestone appearance. No erythema, white exudate or obstruction. NECK: supple. Midline trachea. RESP: Breath sounds are symmetrical bilaterally. Unlabored without SOB. Speaking in full sentences. Clear to auscultation bilaterally anteriorly and posteriorly. No wheezes. No rales or rhonchi. Non-productive cough when elicited. CV: normal rate. Regular rhythm. S1, S2 audible. No murmur noted. No rubs, clicks or gallops noted. HEME/LYMPH: peripheral pulses palpable 2+ x 4 extremities. No peripheral edema is noted. No cervical adenopathy noted. SKIN: clean dry and intact throughout.  no rashes    Assessment/Plan:  Differential diagnosis and treatment options reviewed with patient who is in agreement with treatment plan as outlined below. ICD-10-CM ICD-9-CM    1. Bilateral otitis media with effusion  H65.93 381.4       2. Essential hypertension  I10 401.9       3. Mixed hyperlipidemia  E78.2 272.2       No signs of infection. Recommend she continue with flonase daily. Educated on proper technique. OTC nasal saline spray  2-3 sprays per nostril 2-4 times a day to clear eustachian tube and assist with post nasal drip symptoms. OTC antihistamines (Zyrtec or Claritin)  to reduce allergic symptoms such as sneezing, runny nose and itchy eyes. If symptoms do not improve then send to ENT for further evaluation. HTN:   Current treatment plan is effective, continue with current medications  Discussed diet, exercise and lifestyle management for hypertension  BMI  Encourage patient to drink more water. Discussed hypertensive warning signs, follow up if develop  Follow up in 3 months    HLD:   Discussed diet, exercise and lifestyle management     Medication Side Effects and Warnings were discussed with patient: yes  Patient Labs were reviewed: yes  Patient Past Records were reviewed:  yes    Follow-up and Dispositions    Return in 3 months (on 5/24/2023), or if symptoms worsen or fail to improve, for HTN follow-up . Verbal and written instructions (see AVS) provided. Patient expresses understanding and agreement of diagnosis and treatment plan.     Olga Dietz NP

## 2023-02-24 ENCOUNTER — OFFICE VISIT (OUTPATIENT)
Dept: FAMILY MEDICINE CLINIC | Age: 58
End: 2023-02-24
Payer: COMMERCIAL

## 2023-02-24 VITALS
HEIGHT: 69 IN | RESPIRATION RATE: 17 BRPM | WEIGHT: 178 LBS | TEMPERATURE: 98.6 F | HEART RATE: 82 BPM | DIASTOLIC BLOOD PRESSURE: 77 MMHG | OXYGEN SATURATION: 95 % | SYSTOLIC BLOOD PRESSURE: 104 MMHG | BODY MASS INDEX: 26.36 KG/M2

## 2023-02-24 DIAGNOSIS — H65.93 BILATERAL OTITIS MEDIA WITH EFFUSION: Primary | ICD-10-CM

## 2023-02-24 DIAGNOSIS — E78.2 MIXED HYPERLIPIDEMIA: ICD-10-CM

## 2023-02-24 DIAGNOSIS — I10 ESSENTIAL HYPERTENSION: ICD-10-CM

## 2023-02-24 RX ORDER — MULTIVIT,IRON,MINERALS/LUTEIN
TABLET ORAL
COMMUNITY

## 2023-02-24 NOTE — PATIENT INSTRUCTIONS
Heart-Healthy Diet: Care Instructions  Your Care Instructions     A heart-healthy diet has lots of vegetables, fruits, nuts, beans, and whole grains, and is low in salt. It limits foods that are high in saturated fat, such as meats, cheeses, and fried foods. It may be hard to change your diet, but even small changes can lower your risk of heart attack and heart disease. Follow-up care is a key part of your treatment and safety. Be sure to make and go to all appointments, and call your doctor if you are having problems. It's also a good idea to know your test results and keep a list of the medicines you take. How can you care for yourself at home? Watch your portions  Use food labels to learn what the recommended servings are for the foods you eat. Eat only the number of calories you need to stay at a healthy weight. If you need to lose weight, eat fewer calories than your body burns (through exercise and other physical activity). Eat more fruits and vegetables  Eat a variety of fruit and vegetables every day. Dark green, deep orange, red, or yellow fruits and vegetables are especially good for you. Examples include spinach, carrots, peaches, and berries. Keep carrots, celery, and other veggies handy for snacks. Buy fruit that is in season and store it where you can see it so that you will be tempted to eat it. Cook dishes that have a lot of veggies in them, such as stir-fries and soups. Limit saturated fat  Read food labels, and try to avoid saturated fats. They increase your risk of heart disease. Use olive or canola oil when you cook. Bake, broil, grill, or steam foods instead of frying them. Choose lean meats instead of high-fat meats such as hot dogs and sausages. Cut off all visible fat when you prepare meat. Eat fish, skinless poultry, and meat alternatives such as soy products instead of high-fat meats. Soy products, such as tofu, may be especially good for your heart.   Choose low-fat or fat-free milk and dairy products. Eat foods high in fiber  Eat a variety of grain products every day. Include whole-grain foods that have lots of fiber and nutrients. Examples of whole-grain foods include oats, whole wheat bread, and brown rice. Buy whole-grain breads and cereals, instead of white bread or pastries. Limit salt and sodium  Limit how much salt and sodium you eat to help lower your blood pressure. Taste food before you salt it. Add only a little salt when you think you need it. With time, your taste buds will adjust to less salt. Eat fewer snack items, fast foods, and other high-salt, processed foods. Check food labels for the amount of sodium in packaged foods. Choose low-sodium versions of canned goods (such as soups, vegetables, and beans). Limit sugar  Limit drinks and foods with added sugar. These include candy, desserts, and soda pop. Limit alcohol  Limit alcohol to no more than 2 drinks a day for men and 1 drink a day for women. Too much alcohol can cause health problems. When should you call for help? Watch closely for changes in your health, and be sure to contact your doctor if:    You would like help planning heart-healthy meals. Where can you learn more? Go to http://www.[employer].com/  Enter V137 in the search box to learn more about \"Heart-Healthy Diet: Care Instructions. \"  Current as of: October 6, 2021               Content Version: 13.4  © 2201-5399 [employer]. Care instructions adapted under license by [employer] (which disclaims liability or warranty for this information). If you have questions about a medical condition or this instruction, always ask your healthcare professional. [address] any warranty or liability for your use of this information.

## 2023-02-24 NOTE — PROGRESS NOTES
1. \"Have you been to the ER, urgent care clinic since your last visit? Hospitalized since your last visit? \" No    2. \"Have you seen or consulted any other health care providers outside of the [address] [place] [address] since your last visit? \" No     3. For patients aged 39-70: Has the patient had a colonoscopy / FIT/ Cologuard? Yes - Care Gap present. Most recent result on file      If the patient is female:    4. For patients aged 41-77: Has the patient had a mammogram within the past 2 years? No      5. For patients aged 21-65: Has the patient had a pap smear? Yes - Care Gap present. Most recent result on file    Health Maintenance Due   Topic Date Due    COVID-19 Vaccine (1) Never done    Pneumococcal 0-64 years (1 - PCV) Never done    Shingles Vaccine (1 of 2) Never done    Breast Cancer Screen Mammogram  10/23/2021    Flu Vaccine (1) Never done    Cervical cancer screen  10/23/2022     Chief Complaint   Patient presents with    Ear Fullness     Pt states she believes she has some fluid in her ears, sometimes causes her to feel off balance.      Medication Evaluation     Visit Vitals  /77 (BP 1 Location: Left upper arm, BP Patient Position: Sitting, BP Cuff Size: Adult)   Pulse 82   Temp 98.6 °F (37 °C) (Temporal)   Resp 17   Ht 5' 8.5\" (1.74 m)   Wt 178 lb (80.7 kg)   SpO2 95%   BMI 26.67 kg/m²

## 2023-08-09 DIAGNOSIS — I10 ESSENTIAL (PRIMARY) HYPERTENSION: ICD-10-CM

## 2023-08-09 RX ORDER — LOSARTAN POTASSIUM 25 MG/1
TABLET ORAL
Qty: 90 TABLET | Refills: 1 | Status: SHIPPED | OUTPATIENT
Start: 2023-08-09

## 2023-11-18 DIAGNOSIS — E78.2 MIXED HYPERLIPIDEMIA: ICD-10-CM

## 2023-11-18 DIAGNOSIS — I10 ESSENTIAL (PRIMARY) HYPERTENSION: ICD-10-CM

## 2023-11-18 RX ORDER — ATORVASTATIN CALCIUM 10 MG/1
TABLET, FILM COATED ORAL
Qty: 90 TABLET | Refills: 3 | Status: SHIPPED | OUTPATIENT
Start: 2023-11-18

## 2023-11-18 RX ORDER — HYDROCHLOROTHIAZIDE 12.5 MG/1
TABLET ORAL
Qty: 90 TABLET | Refills: 3 | Status: SHIPPED | OUTPATIENT
Start: 2023-11-18